# Patient Record
Sex: MALE | Race: WHITE | NOT HISPANIC OR LATINO | URBAN - METROPOLITAN AREA
[De-identification: names, ages, dates, MRNs, and addresses within clinical notes are randomized per-mention and may not be internally consistent; named-entity substitution may affect disease eponyms.]

---

## 2023-09-08 ENCOUNTER — HOSPITAL ENCOUNTER (OUTPATIENT)
Dept: DATA CONVERSION | Facility: HOSPITAL | Age: 58
End: 2023-09-08
Attending: STUDENT IN AN ORGANIZED HEALTH CARE EDUCATION/TRAINING PROGRAM | Admitting: STUDENT IN AN ORGANIZED HEALTH CARE EDUCATION/TRAINING PROGRAM
Payer: COMMERCIAL

## 2023-09-08 DIAGNOSIS — K22.2 ESOPHAGEAL OBSTRUCTION: ICD-10-CM

## 2023-09-08 DIAGNOSIS — K57.30 DIVERTICULOSIS OF LARGE INTESTINE WITHOUT PERFORATION OR ABSCESS WITHOUT BLEEDING: ICD-10-CM

## 2023-09-08 DIAGNOSIS — K31.89 OTHER DISEASES OF STOMACH AND DUODENUM: ICD-10-CM

## 2023-09-08 DIAGNOSIS — K64.4 RESIDUAL HEMORRHOIDAL SKIN TAGS: ICD-10-CM

## 2023-09-08 DIAGNOSIS — K29.70 GASTRITIS, UNSPECIFIED, WITHOUT BLEEDING: ICD-10-CM

## 2023-09-08 DIAGNOSIS — K62.1 RECTAL POLYP: ICD-10-CM

## 2023-09-08 DIAGNOSIS — Z12.11 ENCOUNTER FOR SCREENING FOR MALIGNANT NEOPLASM OF COLON: ICD-10-CM

## 2023-09-08 DIAGNOSIS — R13.10 DYSPHAGIA, UNSPECIFIED: ICD-10-CM

## 2023-09-08 DIAGNOSIS — R13.10 DYSPHAGIA: ICD-10-CM

## 2023-09-08 DIAGNOSIS — K64.1 SECOND DEGREE HEMORRHOIDS: ICD-10-CM

## 2023-09-08 DIAGNOSIS — K21.00 GASTRO-ESOPHAGEAL REFLUX DISEASE WITH ESOPHAGITIS, WITHOUT BLEEDING: ICD-10-CM

## 2023-09-15 LAB
COMPLETE PATHOLOGY REPORT: NORMAL
CONVERTED ADDENDUM DIAGNOSIS 2: NORMAL
CONVERTED CLINICAL DIAGNOSIS-HISTORY: NORMAL
CONVERTED FINAL DIAGNOSIS: NORMAL
CONVERTED FINAL REPORT PDF LINK TO COPY AND PASTE: NORMAL
CONVERTED GROSS DESCRIPTION: NORMAL

## 2023-09-29 VITALS — HEIGHT: 70 IN | BODY MASS INDEX: 22.95 KG/M2 | WEIGHT: 160.27 LBS

## 2024-05-09 ENCOUNTER — OFFICE VISIT (OUTPATIENT)
Dept: ORTHOPEDIC SURGERY | Facility: CLINIC | Age: 59
End: 2024-05-09
Payer: COMMERCIAL

## 2024-05-09 ENCOUNTER — HOSPITAL ENCOUNTER (OUTPATIENT)
Dept: RADIOLOGY | Facility: CLINIC | Age: 59
Discharge: HOME | End: 2024-05-09
Payer: COMMERCIAL

## 2024-05-09 DIAGNOSIS — M25.561 RIGHT KNEE PAIN, UNSPECIFIED CHRONICITY: ICD-10-CM

## 2024-05-09 DIAGNOSIS — M17.11 PRIMARY OSTEOARTHRITIS OF RIGHT KNEE: Primary | ICD-10-CM

## 2024-05-09 PROCEDURE — 99203 OFFICE O/P NEW LOW 30 MIN: CPT | Performed by: ORTHOPAEDIC SURGERY

## 2024-05-09 PROCEDURE — 73564 X-RAY EXAM KNEE 4 OR MORE: CPT | Mod: RT

## 2024-05-09 PROCEDURE — 99213 OFFICE O/P EST LOW 20 MIN: CPT | Performed by: ORTHOPAEDIC SURGERY

## 2024-05-09 PROCEDURE — 73564 X-RAY EXAM KNEE 4 OR MORE: CPT | Mod: RIGHT SIDE | Performed by: STUDENT IN AN ORGANIZED HEALTH CARE EDUCATION/TRAINING PROGRAM

## 2024-05-09 PROCEDURE — 1036F TOBACCO NON-USER: CPT | Performed by: ORTHOPAEDIC SURGERY

## 2024-05-09 RX ORDER — ATORVASTATIN CALCIUM 20 MG/1
TABLET, FILM COATED ORAL
COMMUNITY

## 2024-05-09 RX ORDER — OMEPRAZOLE 20 MG/1
20 CAPSULE, DELAYED RELEASE ORAL 2 TIMES DAILY
COMMUNITY
Start: 2023-11-06

## 2024-05-09 ASSESSMENT — PAIN SCALES - GENERAL: PAINLEVEL_OUTOF10: 8

## 2024-05-09 ASSESSMENT — PAIN - FUNCTIONAL ASSESSMENT: PAIN_FUNCTIONAL_ASSESSMENT: 0-10

## 2024-05-09 ASSESSMENT — PAIN DESCRIPTION - DESCRIPTORS: DESCRIPTORS: ACHING;THROBBING

## 2024-05-09 NOTE — PROGRESS NOTES
Patient is a 58-year-old gentleman who presents today for evaluation of right knee osteoarthritis.  He has a remote history of arthroscopy.  He takes ibuprofen.  He has not had any cortisone injections.  He had a knee replacement on the left at another hospital.    Right knee:  AAOx3, NAD, walks with a moderate antalgic gait  Varus allignment  Range of motion lacks 10 degrees of full extension and flexes to 110 degrees  Stable to varus/valgus/anterior/posterior stress through out the range of motion  Slight laxity with varus stress  Diffuse medial  joint line tenderness to palpation  Moderate effusion  SILT in a zulay/saph/per/tib distribution  5/5 knee extension/df/pf/ehl  ½ dorsalis pedis and posterior tibial pulse  no popliteal lymphadenopathy  no other overlying lesions  mood: euthymic  Respirations non labored    Plain films were reviewed by myself in clinic today.  He has advanced osteoarthritis of his right knee with complete loss of his medial joint space, underlying sclerosis and tricompartmental osteophytic change.    We discussed further conservative treatment versus surgery with him today in clinic.  Ultimately he would benefit from total knee replacement.  He would like to hold off for as long as he can.  He underwent a cortisone injection today in clinic.  He can repeat this every 3 to 4 months as needed.  All of his questions were answered.    This note was created using voice recognition software and was not corrected for typographical or grammatical errors.

## 2024-08-08 ENCOUNTER — APPOINTMENT (OUTPATIENT)
Dept: ORTHOPEDIC SURGERY | Facility: CLINIC | Age: 59
End: 2024-08-08
Payer: COMMERCIAL

## 2024-10-10 ENCOUNTER — OFFICE VISIT (OUTPATIENT)
Dept: ORTHOPEDIC SURGERY | Facility: CLINIC | Age: 59
End: 2024-10-10
Payer: COMMERCIAL

## 2024-10-10 VITALS — WEIGHT: 165 LBS | BODY MASS INDEX: 23.62 KG/M2 | HEIGHT: 70 IN

## 2024-10-10 DIAGNOSIS — M17.11 PRIMARY OSTEOARTHRITIS OF RIGHT KNEE: Primary | ICD-10-CM

## 2024-10-10 PROCEDURE — 3008F BODY MASS INDEX DOCD: CPT | Performed by: ORTHOPAEDIC SURGERY

## 2024-10-10 PROCEDURE — 20610 DRAIN/INJ JOINT/BURSA W/O US: CPT | Mod: RT | Performed by: ORTHOPAEDIC SURGERY

## 2024-10-10 PROCEDURE — 1036F TOBACCO NON-USER: CPT | Performed by: ORTHOPAEDIC SURGERY

## 2024-10-10 PROCEDURE — 2500000004 HC RX 250 GENERAL PHARMACY W/ HCPCS (ALT 636 FOR OP/ED): Performed by: ORTHOPAEDIC SURGERY

## 2024-10-10 RX ORDER — LIDOCAINE HYDROCHLORIDE 10 MG/ML
4 INJECTION, SOLUTION INFILTRATION; PERINEURAL
Status: COMPLETED | OUTPATIENT
Start: 2024-10-10 | End: 2024-10-10

## 2024-10-10 RX ORDER — TRIAMCINOLONE ACETONIDE 40 MG/ML
1 INJECTION, SUSPENSION INTRA-ARTICULAR; INTRAMUSCULAR
Status: COMPLETED | OUTPATIENT
Start: 2024-10-10 | End: 2024-10-10

## 2024-10-10 ASSESSMENT — PAIN - FUNCTIONAL ASSESSMENT: PAIN_FUNCTIONAL_ASSESSMENT: 0-10

## 2024-10-10 ASSESSMENT — PAIN SCALES - GENERAL: PAINLEVEL_OUTOF10: 10 - WORST POSSIBLE PAIN

## 2024-10-10 NOTE — PROGRESS NOTES
L Inj/Asp: R knee on 10/10/2024 3:21 PM  Indications: pain and joint swelling  Details: 22 G needle, anterolateral approach  Medications: 1 mL triamcinolone acetonide 40 mg/mL; 4 mL lidocaine 10 mg/mL (1 %)  Outcome: tolerated well, no immediate complications    Discussion:  I discussed the conservative treatment options for knee osteoarthritis including but not limited to physical therapy, oral NSAIDS, activity and lifestyle modification, and corticosteroid injections. Pt has elected to undergo a cortisone injection today. I have explained the risk and benefits of an injection including the possibility of joint infection, bleeding, damage to cartilage, allergic reaction. Patient verbalized understanding and gave verbal consent wishes to proceed with a intra-articular cortisone injection for their knee.    Procedure:  After discussing the risk and benefits of the procedure, we proceeded with an intra-articular right knee injection.    With the patient's informed verbal consent, the right knee was prepped in standard sterile fashion with Chlorhexidine. The skin was then anesthetized with ethyl chloride spray and cleaned again with Chlorhexidine. The knee was then apirated/injected with a prefilled 20-gauge syringe of 40 mg Kenalog + 4 ml Lidocaine using the lateral approach without complications.  The patient tolerated this well and felt immediate initial relief of symptoms. A bandaid was applied and the patient ambulated out of the clinic on ther own accord without difficulty. Patient was instructed to avoid physical activity for 24-48 hours to prevent the knees from swelling and may ice the knees as tolerated. Patient should contact the office if any signs of of infection appear: redness, fever, chills, drainage, swelling or warmth to the knees.  Pt understands that the injections can be repeated no sooner than 3 months.    Procedure, treatment alternatives, risks and benefits explained, specific risks discussed.  Consent was given by the patient. Immediately prior to procedure a time out was called to verify the correct patient, procedure, equipment, support staff and site/side marked as required. Patient was prepped and draped in the usual sterile fashion.

## 2025-02-03 PROBLEM — M17.11 UNILATERAL PRIMARY OSTEOARTHRITIS, RIGHT KNEE: Status: ACTIVE | Noted: 2025-02-02

## 2025-02-20 ENCOUNTER — OFFICE VISIT (OUTPATIENT)
Dept: ORTHOPEDIC SURGERY | Facility: CLINIC | Age: 60
End: 2025-02-20
Payer: COMMERCIAL

## 2025-02-20 DIAGNOSIS — M17.11 PRIMARY OSTEOARTHRITIS OF RIGHT KNEE: Primary | ICD-10-CM

## 2025-02-20 PROCEDURE — 99214 OFFICE O/P EST MOD 30 MIN: CPT | Performed by: ORTHOPAEDIC SURGERY

## 2025-02-20 PROCEDURE — 1036F TOBACCO NON-USER: CPT | Performed by: ORTHOPAEDIC SURGERY

## 2025-02-20 NOTE — PROGRESS NOTES
Patient is a 59-year-old gentleman who presents today for evaluation of upcoming right total knee arthroplasty having decided to proceed with surgery.  He rates his pain as 9 out of 10.  He is failed a greater than 3-month trial reasonable conservative treatment including cortisone injections, ibuprofen and activity modification.  He has difficulty walking down stairs or going any sort of distance.  He is extremely fed up with his quality of life and like to proceed with total knee arthroplasty which is indicated at this time.    Right knee:AAOx3, NAD, walks with a moderate antalgic gait  Varus allignment  Range of motion lacks 10 degrees of full extension and flexes to 110 degrees  Stable to varus/valgus/anterior/posterior stress through out the range of motion  Slight laxity with varus stress  Diffuse medial  joint line tenderness to palpation  Moderate effusion  SILT in a zulay/saph/per/tib distribution  5/5 knee extension/df/pf/ehl  ½ dorsalis pedis and posterior tibial pulse  no popliteal lymphadenopathy  no other overlying lesions  mood: euthymic  Respirations non labored    Plain films were reviewed by myself in clinic today.  They have advanced osteoarthritis of their knee with significant joint space narrowing, bone on bone changes, underlying sclerosis and tricompartmental osteophytic change.    Patient is now failed a greater than 3-month trial of reasonable conservative treatment and wishes proceed with surgery which is indicated at this time.  Discussed the risk benefits alternatives to surgery.  All of their questions were answered.    Procedure: right total knee  [place] [facility] [place]  ICD10: M17.11  CPT: 00799  Stay: outpatient  Equipment: [facility]    I talked with the patient at length about risks, limitations, benefits and alternatives to total knee replacement today. I reviewed concerns about implant wear, loosening, breakage, infection and infection prophylaxis, DVT, PE, death and other medical  and anesthetic complications of surgery. We talked about the potential for persistent pain following surgery since there are many possible causes for knee and leg pain. The patient was advised that knee replacement will only relieve pain that is coming from the knee. We talked about limited range of motion following knee replacement and the importance of physical therapy and their motivation. We talked about improvements in pain management post-operatively and our accelerated rehab program. We talked about wound healing issues and neurovascular complications of surgery. I reviewed functional and activity restrictions in detail. We discussed the fact that many of our patients are able to go home in 1 day or less depending on their health, mobility, pre-op preparation, individual home situation and personal preference.  The patient has identified their personal goals of their joint replacement surgery and recovery and we have discussed them. These are documented in our [employer] patient engagement platform. In addition, we have discussed the advantages and disadvantages of various implant and fixation options, as well as various surgical approaches.  The basic concepts of the joint replacement procedure has been reviewed with the patient and the patient has been provided the opportunity to see an actual implant either in the office or in our pre-op education class.  All of the patients questions were answered. The patient can call my office to schedule surgery and the pre-op teaching class. I told the patient that they should contact their primary care physician to discuss fitness for surgery.    This note was created using voice recognition software and was not corrected for typographical or grammatical errors.

## 2025-03-14 ENCOUNTER — HOSPITAL ENCOUNTER (OUTPATIENT)
Dept: RADIOLOGY | Facility: HOSPITAL | Age: 60
Discharge: HOME | End: 2025-03-14
Payer: COMMERCIAL

## 2025-03-14 ENCOUNTER — PRE-ADMISSION TESTING (OUTPATIENT)
Dept: PREADMISSION TESTING | Facility: HOSPITAL | Age: 60
End: 2025-03-14
Payer: COMMERCIAL

## 2025-03-14 ENCOUNTER — EDUCATION (OUTPATIENT)
Dept: ORTHOPEDIC SURGERY | Facility: HOSPITAL | Age: 60
End: 2025-03-14
Payer: COMMERCIAL

## 2025-03-14 ENCOUNTER — LAB (OUTPATIENT)
Dept: LAB | Facility: HOSPITAL | Age: 60
End: 2025-03-14
Payer: COMMERCIAL

## 2025-03-14 VITALS
TEMPERATURE: 98.1 F | WEIGHT: 171 LBS | SYSTOLIC BLOOD PRESSURE: 136 MMHG | OXYGEN SATURATION: 100 % | HEART RATE: 63 BPM | DIASTOLIC BLOOD PRESSURE: 91 MMHG | HEIGHT: 70 IN | BODY MASS INDEX: 24.48 KG/M2 | RESPIRATION RATE: 16 BRPM

## 2025-03-14 DIAGNOSIS — M17.11 UNILATERAL PRIMARY OSTEOARTHRITIS, RIGHT KNEE: ICD-10-CM

## 2025-03-14 DIAGNOSIS — E78.5 HYPERLIPIDEMIA, UNSPECIFIED HYPERLIPIDEMIA TYPE: ICD-10-CM

## 2025-03-14 DIAGNOSIS — Z01.818 PREOP TESTING: Primary | ICD-10-CM

## 2025-03-14 DIAGNOSIS — Z01.818 ENCOUNTER FOR OTHER PREPROCEDURAL EXAMINATION: Primary | ICD-10-CM

## 2025-03-14 LAB
ALBUMIN SERPL BCP-MCNC: 4.9 G/DL (ref 3.4–5)
ALP SERPL-CCNC: 83 U/L (ref 33–120)
ALT SERPL W P-5'-P-CCNC: 29 U/L (ref 10–52)
ANION GAP SERPL CALC-SCNC: 13 MMOL/L (ref 10–20)
AST SERPL W P-5'-P-CCNC: 25 U/L (ref 9–39)
ATRIAL RATE: 67 BPM
BILIRUB SERPL-MCNC: 0.7 MG/DL (ref 0–1.2)
BUN SERPL-MCNC: 15 MG/DL (ref 6–23)
CALCIUM SERPL-MCNC: 10 MG/DL (ref 8.6–10.3)
CHLORIDE SERPL-SCNC: 102 MMOL/L (ref 98–107)
CO2 SERPL-SCNC: 27 MMOL/L (ref 21–32)
CREAT SERPL-MCNC: 0.94 MG/DL (ref 0.5–1.3)
EGFRCR SERPLBLD CKD-EPI 2021: >90 ML/MIN/1.73M*2
ERYTHROCYTE [DISTWIDTH] IN BLOOD BY AUTOMATED COUNT: 12.4 % (ref 11.5–14.5)
EST. AVERAGE GLUCOSE BLD GHB EST-MCNC: 88 MG/DL
GLUCOSE SERPL-MCNC: 94 MG/DL (ref 74–99)
HBA1C MFR BLD: 4.7 %
HCT VFR BLD AUTO: 44.2 % (ref 41–52)
HGB BLD-MCNC: 15.3 G/DL (ref 13.5–17.5)
MCH RBC QN AUTO: 31.4 PG (ref 26–34)
MCHC RBC AUTO-ENTMCNC: 34.6 G/DL (ref 32–36)
MCV RBC AUTO: 91 FL (ref 80–100)
NRBC BLD-RTO: NORMAL /100{WBCS}
P AXIS: 52 DEGREES
P OFFSET: 192 MS
P ONSET: 147 MS
PLATELET # BLD AUTO: 289 X10*3/UL (ref 150–450)
POTASSIUM SERPL-SCNC: 5.2 MMOL/L (ref 3.5–5.3)
PR INTERVAL: 146 MS
PROT SERPL-MCNC: 7.4 G/DL (ref 6.4–8.2)
Q ONSET: 220 MS
QRS COUNT: 11 BEATS
QRS DURATION: 84 MS
QT INTERVAL: 372 MS
QTC CALCULATION(BAZETT): 393 MS
QTC FREDERICIA: 386 MS
R AXIS: 34 DEGREES
RBC # BLD AUTO: 4.87 X10*6/UL (ref 4.5–5.9)
SODIUM SERPL-SCNC: 137 MMOL/L (ref 136–145)
T AXIS: 42 DEGREES
T OFFSET: 406 MS
VENTRICULAR RATE: 67 BPM
WBC # BLD AUTO: 5.9 X10*3/UL (ref 4.4–11.3)

## 2025-03-14 PROCEDURE — 77073 BONE LENGTH STUDIES: CPT

## 2025-03-14 PROCEDURE — 93005 ELECTROCARDIOGRAM TRACING: CPT

## 2025-03-14 PROCEDURE — 99204 OFFICE O/P NEW MOD 45 MIN: CPT | Performed by: PHYSICIAN ASSISTANT

## 2025-03-14 PROCEDURE — 85027 COMPLETE CBC AUTOMATED: CPT

## 2025-03-14 PROCEDURE — 80053 COMPREHEN METABOLIC PANEL: CPT

## 2025-03-14 PROCEDURE — 87081 CULTURE SCREEN ONLY: CPT | Mod: BEALAB

## 2025-03-14 PROCEDURE — 83036 HEMOGLOBIN GLYCOSYLATED A1C: CPT

## 2025-03-14 PROCEDURE — 73562 X-RAY EXAM OF KNEE 3: CPT | Mod: RT

## 2025-03-14 PROCEDURE — 93010 ELECTROCARDIOGRAM REPORT: CPT | Performed by: INTERNAL MEDICINE

## 2025-03-14 RX ORDER — ASCORBIC ACID 250 MG
250 TABLET ORAL DAILY
COMMUNITY

## 2025-03-14 RX ORDER — CHLORHEXIDINE GLUCONATE 40 MG/ML
SOLUTION TOPICAL DAILY
Qty: 470 ML | Refills: 0 | Status: SHIPPED | OUTPATIENT
Start: 2025-03-14 | End: 2025-03-19

## 2025-03-14 RX ORDER — CHLORHEXIDINE GLUCONATE ORAL RINSE 1.2 MG/ML
SOLUTION DENTAL
Qty: 473 ML | Refills: 0 | Status: SHIPPED | OUTPATIENT
Start: 2025-03-14

## 2025-03-14 RX ORDER — CHOLECALCIFEROL (VITAMIN D3) 25 MCG
1000 TABLET ORAL DAILY
COMMUNITY

## 2025-03-14 RX ORDER — IBUPROFEN 800 MG/1
800 TABLET ORAL EVERY 8 HOURS PRN
COMMUNITY

## 2025-03-14 ASSESSMENT — ENCOUNTER SYMPTOMS
ENDOCRINE NEGATIVE: 1
EYES NEGATIVE: 1
NECK NEGATIVE: 1
CARDIOVASCULAR NEGATIVE: 1
RESPIRATORY NEGATIVE: 1
CONSTITUTIONAL NEGATIVE: 1
GASTROINTESTINAL NEGATIVE: 1
NEUROLOGICAL NEGATIVE: 1

## 2025-03-14 ASSESSMENT — DUKE ACTIVITY SCORE INDEX (DASI)
CAN YOU WALK A BLOCK OR TWO ON LEVEL GROUND: YES
DASI METS SCORE: 8
CAN YOU HAVE SEXUAL RELATIONS: YES
CAN YOU CLIMB A FLIGHT OF STAIRS OR WALK UP A HILL: YES
CAN YOU DO HEAVY WORK AROUND THE HOUSE LIKE SCRUBBING FLOORS OR LIFTING AND MOVING HEAVY FURNITURE: YES
CAN YOU DO MODERATE WORK AROUND THE HOUSE LIKE VACUUMING, SWEEPING FLOORS OR CARRYING GROCERIES: YES
CAN YOU TAKE CARE OF YOURSELF (EAT, DRESS, BATHE, OR USE TOILET): YES
CAN YOU DO LIGHT WORK AROUND THE HOUSE LIKE DUSTING OR WASHING DISHES: YES
CAN YOU RUN A SHORT DISTANCE: NO
CAN YOU WALK INDOORS, SUCH AS AROUND YOUR HOUSE: YES
CAN YOU DO YARD WORK LIKE RAKING LEAVES, WEEDING OR PUSHING A MOWER: YES
CAN YOU PARTICIPATE IN STRENOUS SPORTS LIKE SWIMMING, SINGLES TENNIS, FOOTBALL, BASKETBALL, OR SKIING: NO
TOTAL_SCORE: 42.7
CAN YOU PARTICIPATE IN MODERATE RECREATIONAL ACTIVITIES LIKE GOLF, BOWLING, DANCING, DOUBLES TENNIS OR THROWING A BASEBALL OR FOOTBALL: YES

## 2025-03-14 ASSESSMENT — LIFESTYLE VARIABLES: SMOKING_STATUS: NONSMOKER

## 2025-03-14 ASSESSMENT — PAIN - FUNCTIONAL ASSESSMENT: PAIN_FUNCTIONAL_ASSESSMENT: 0-10

## 2025-03-14 ASSESSMENT — PAIN DESCRIPTION - DESCRIPTORS: DESCRIPTORS: SHARP

## 2025-03-14 ASSESSMENT — PAIN SCALES - GENERAL: PAINLEVEL_OUTOF10: 10 - WORST POSSIBLE PAIN

## 2025-03-14 NOTE — CPM/PAT H&P
CPM/PAT Evaluation       Name: Tye Rowley (Tye Rowley)  /Age: 1965/59 y.o.     Visit Type:   In-Person       Chief Complaint: right knee pain    HPI : Patient is a 60 yo M scheduled for right total knee replacement on 25 with Dr. Lisa secondary to right knee osteoarthritis. Pt notes sharp right knee pain with every step. Patient was referred by Dr. Lisa to CPM today for perioperative risk stratification and optimization. Patient's PMHx is notable for HLD, GERD      Past Medical History:   Diagnosis Date    Arthritis     GERD (gastroesophageal reflux disease)     History of blood transfusion     at age 19    Hyperlipidemia        Past Surgical History:   Procedure Laterality Date    COLONOSCOPY      KNEE ARTHROPLASTY Left 2017    UPPER GASTROINTESTINAL ENDOSCOPY         Patient  has no history on file for sexual activity.    Family History   Problem Relation Name Age of Onset    Alcohol abuse Mother Hetal     Heart attack Father  60    Heart disease Maternal Grandfather Donte        No Known Allergies    Prior to Admission medications    Medication Sig Start Date End Date Taking? Authorizing Provider   ascorbic acid (Vitamin C) 250 mg tablet Take 1 tablet (250 mg) by mouth once daily.   Yes Historical Provider, MD   cholecalciferol (Vitamin D3) 25 mcg (1000 units) tablet Take 1 tablet (1,000 Units) by mouth once daily.   Yes Historical Provider, MD   ibuprofen 800 mg tablet Take 1 tablet (800 mg) by mouth every 8 hours if needed for mild pain (1 - 3).   Yes Historical Provider, MD   ZINC ACETATE ORAL Use in the mouth or throat.   Yes Historical Provider, MD   atorvastatin (Lipitor) 20 mg tablet     Historical Provider, MD   chlorhexidine (Hibiclens) 4 % external liquid Apply topically once daily for 5 days. 3/14/25 3/19/25  Soraida Bucio PA-C   chlorhexidine (Peridex) 0.12 % solution Swish and spit 15 mL night before surgery and morning of surgery 3/14/25   Soraida Bucio PA-C    omeprazole (PriLOSEC) 20 mg DR capsule Take 1 capsule (20 mg) by mouth 2 times a day.  Patient not taking: Reported on 3/14/2025 11/6/23   Historical Provider, MD HANSON ROS:   Constitutional:   neg    Neuro/Psych:   neg    Eyes:   neg    Ears:   neg    Nose:   neg    Mouth:   neg    Throat:   neg    Neck:   neg    Cardio:   neg    Respiratory:   neg    Endocrine:   neg    GI:   neg    :   neg    Musculoskeletal:    +Right knee pain  Hematologic:   neg    Skin:  neg        Physical Exam  Vitals and nursing note reviewed.   Constitutional:       General: He is not in acute distress.     Appearance: He is normal weight. He is not ill-appearing or toxic-appearing.   HENT:      Head: Normocephalic and atraumatic.      Right Ear: External ear normal.      Left Ear: External ear normal.      Nose: Nose normal.      Mouth/Throat:      Mouth: Mucous membranes are moist.   Eyes:      Extraocular Movements: Extraocular movements intact.      Conjunctiva/sclera: Conjunctivae normal.   Neck:      Vascular: No carotid bruit.   Cardiovascular:      Rate and Rhythm: Normal rate and regular rhythm.      Pulses: Normal pulses.      Heart sounds: Normal heart sounds.   Pulmonary:      Effort: Pulmonary effort is normal.      Breath sounds: Normal breath sounds.   Abdominal:      General: There is no distension.      Palpations: Abdomen is soft.      Tenderness: There is no abdominal tenderness.   Musculoskeletal:         General: Normal range of motion.      Cervical back: Normal range of motion.   Skin:     General: Skin is warm and dry.      Capillary Refill: Capillary refill takes less than 2 seconds.   Neurological:      General: No focal deficit present.      Mental Status: He is alert.   Psychiatric:         Mood and Affect: Mood normal.         Behavior: Behavior normal.          PAT AIRWAY:   Airway:     Mallampati::  I    TM distance::  >3 FB    Neck ROM::  Full             Visit Vitals  BP (!) 136/91   Pulse 63  "  Temp 36.7 °C (98.1 °F)   Resp 16   Ht 1.778 m (5' 10\")   Wt 77.6 kg (171 lb)   SpO2 100%   BMI 24.54 kg/m²   Smoking Status Former   BSA 1.96 m²       DASI Risk Score      Flowsheet Row Pre-Admission Testing from 3/14/2025 in [facility] Questionnaire Series Submission from 2/11/2025 in [facility] with Generic Provider Yamilka   Can you take care of yourself (eat, dress, bathe, or use toilet)?  2.75 filed at 03/14/2025 0810 2.75  filed at 02/11/2025 1045   Can you walk indoors, such as around your house? 1.75 filed at 03/14/2025 0810 1.75  filed at 02/11/2025 1045   Can you walk a block or two on level ground?  2.75 filed at 03/14/2025 0810 2.75  filed at 02/11/2025 1045   Can you climb a flight of stairs or walk up a hill? 5.5 filed at 03/14/2025 0810 5.5  filed at 02/11/2025 1045   Can you run a short distance? 0 filed at 03/14/2025 0810 0  filed at 02/11/2025 1045   Can you do light work around the house like dusting or washing dishes? 2.7 filed at 03/14/2025 0810 2.7  filed at 02/11/2025 1045   Can you do moderate work around the house like vacuuming, sweeping floors or carrying groceries? 3.5 filed at 03/14/2025 0810 3.5  filed at 02/11/2025 1045   Can you do heavy work around the house like scrubbing floors or lifting and moving heavy furniture?  8 filed at 03/14/2025 0810 8  filed at 02/11/2025 1045   Can you do yard work like raking leaves, weeding or pushing a mower? 4.5 filed at 03/14/2025 0810 4.5  filed at 02/11/2025 1045   Can you have sexual relations? 5.25 filed at 03/14/2025 0810 5.25  filed at 02/11/2025 1045   Can you participate in moderate recreational activities like golf, bowling, dancing, doubles tennis or throwing a baseball or football? 6 filed at 03/14/2025 0810 6  filed at 02/11/2025 1045   Can you participate in strenous sports like swimming, singles tennis, football, basketball, or skiing? 0 filed at 03/14/2025 0810 0  filed at 02/11/2025 1045   DASI SCORE 42.7 filed " at 03/14/2025 0810 42.7  filed at 02/11/2025 1045   METS Score (Will be calculated only when all the questions are answered) 8 filed at 03/14/2025 0810 8  filed at 02/11/2025 1045          Caprini DVT Assessment      Flowsheet Row Pre-Admission Testing from 3/14/2025 in [facility]   DVT Score (IF A SCORE IS NOT CALCULATING, MUST SELECT A BMI TO COMPLETE) 7 filed at 03/14/2025 0757   Surgical Factors Elective major lower extremity arthroplasty filed at 03/14/2025 0757   BMI (BMI MUST BE CHOSEN) 30 or less filed at 03/14/2025 0757          Modified Frailty Index    No data to display       [id number] Stroke Risk Points  Current as of 2 minutes ago        N/A 0 to 9 Points:      Last Change: N/A          The [id number] risk score (Lip LUDY, et al. 2009. © 2010 American College [employer]) quantifies the risk of stroke for a patient with atrial fibrillation. For patients without atrial fibrillation or under the age of 18 this score appears as N/A. Higher score values generally indicate higher risk of stroke.        This score is not applicable to this patient. Components are not calculated.          Revised Cardiac Risk Index      Flowsheet Row Pre-Admission Testing from 3/14/2025 in [facility]   High-Risk Surgery (Intraperitoneal, Intrathoracic,Suprainguinal vascular) 0 filed at 03/14/2025 0810   History of ischemic heart disease (History of MI, History of positive exercuse test, Current chest paint considered due to myocardial ischemia, Use of nitrate therapy, ECG with pathological Q Waves) 0 filed at 03/14/2025 0810   History of congestive heart failure (pulmonary edemia, bilateral rales or S3 gallop, Paroxysmal nocturnal dyspnea, CXR showing pulmonary vascular redistribution) 0 filed at 03/14/2025 0810   History of cerebrovascular disease (Prior TIA or stroke) 0 filed at 03/14/2025 0810   Pre-operative insulin treatment 0 filed at 03/14/2025 0810   Pre-operative  creatinine>2 mg/dl 0 filed at 03/14/2025 0810   Revised Cardiac Risk Calculator 0 filed at 03/14/2025 0810          Apfel Simplified Score      Flowsheet Row Pre-Admission Testing from 3/14/2025 in [facility]   Smoking status 1 filed at 03/14/2025 0810   History of motion sickness or PONV  0 filed at 03/14/2025 0810   Use of postoperative opioids 0 filed at 03/14/2025 0810   Gender - Female 0=No filed at 03/14/2025 0810   Apfel Simplified Score Calculator 1 filed at 03/14/2025 0810          Risk Analysis Index Results This Encounter    No data found in the last 10 encounters.       Stop Bang Score      Flowsheet Row Pre-Admission Testing from 3/14/2025 in [facility] Questionnaire Series Submission from 2/11/2025 in [facility] with Generic Provider Yamilka   Do you snore loudly? 1  [occasionally] filed at 03/14/2025 0747 1  filed at 02/11/2025 1045   Do you often feel tired or fatigued after your sleep? 1 filed at 03/14/2025 0747 1  filed at 02/11/2025 1045   Has anyone ever observed you stop breathing in your sleep? 0 filed at 03/14/2025 0747 1  filed at 02/11/2025 1045   Do you have or are you being treated for high blood pressure? 0 filed at 03/14/2025 0747 0  filed at 02/11/2025 1045   Recent BMI (Calculated) 24.5 filed at 03/14/2025 0747 23.7  filed at 02/11/2025 1045   Is BMI greater than 35 kg/m2? 0=No filed at 03/14/2025 0747 0=No  filed at 02/11/2025 1045   Age older than 50 years old? 1=Yes filed at 03/14/2025 0747 1=Yes  filed at 02/11/2025 1045   Is your neck circumference greater than 17 inches (Male) or 16 inches (Female)? 1 filed at 03/14/2025 0747 --   Gender - Male 1=Yes filed at 03/14/2025 0747 1=Yes  filed at 02/11/2025 1045   STOP-BANG Total Score 5 filed at 03/14/2025 0747 --          Prodigy: High Risk  Total Score: 8              Prodigy Gender Score          ARISCAT Score for Postoperative Pulmonary Complications      Flowsheet Row Pre-Admission Testing from  3/14/2025 in [facility]   Age Calculated Score 3 filed at 03/14/2025 0759   Preoperative SpO2 0 filed at 03/14/2025 0759   Respiratory infection in the last month Either upper or lower (i.e., URI, bronchitis, pneumonia), with fever and antibiotic treatment 0 filed at 03/14/2025 0759   Preoperative anemia (Hgb less than 10 g/dl) 0 filed at 03/14/2025 0759   Surgical incision  0 filed at 03/14/2025 0759   Duration of surgery  16 filed at 03/14/2025 0759   Emergency Procedure  0 filed at 03/14/2025 0759   ARISCAT Total Score  19 filed at 03/14/2025 0759          [place] Perioperative Risk for Myocardial Infarction or Cardiac Arrest (POLI)      Flowsheet Row Pre-Admission Testing from 3/14/2025 in [facility]   Calculated Age Score 1.16 filed at 03/14/2025 0759   Functional Status  0 filed at 03/14/2025 0759   ASA Class  -3.29 filed at 03/14/2025 0759   Creatinine 0 filed at 03/14/2025 0759   Type of Procedure  0.80 filed at 03/14/2025 0759   POLI Total Score  -6.58 filed at 03/14/2025 0759   POLI % 0.14 filed at 03/14/2025 0759            Assessment & Plan    Neuro:   No diagnosis or significant findings on chart review or clinical presentation and evaluation.    Preoperative brain exercise educational handout provided to patient.    The patient is at an increased risk for perioperative stroke secondary to HLD.    HEENT/Airway:   No diagnosis or significant findings on chart review or clinical presentation and evaluation.   STOP-BANG Score- 5 points high risk for JEFF  Advised to discuss testing for sleep apnea w/ PCP    Mallampati::  I    TM distance::  >3 FB    Neck ROM::  Full  Dentures-denies  Crowns-reports  Implants-denies    Cardiovascular:    -HLD - on statin, has been noncompliant  No additional preoperative testing is currently indicated.  METS: 8  RCRI: 0 points, 3.9%    30 day risk of MACE (risk for cardiac death, nonfatal myocardial infarction, and nonfactal cardiac  arrest  POLI:   0.14 % risk of intraoperative or 30-day postoperative MACE  EKG -normal EKG, normal sinus rhythm Rate 67- No acute changes      Pulmonary:     No diagnosis or significant findings on chart review or clinical presentation and evaluation.   ARISCAT: <26 points, 1.6% risk of in-hospital postoperative pulmonary complication  PRODIGY: High risk for opioid induced respiratory depression  Smoking History-He quit smoking approximately 15 years ago.  Preoperative deep breathing educational handout provided to patient.    Renal:  No diagnosis or significant findings on chart review or clinical presentation and evaluation, however, the patient is at increased risk of perioperative renal complications secondary to age>/= 56 and male sex. Preventative measures include BP monitoring, medication compliance, and hydration management.   CMP-Pending    Endocrine:  No diagnosis or significant findings on chart review or clinical presentation and evaluation.     Hematologic:    No diagnosis or significant findings on chart review or clinical presentation and evaluation.  The patient is not a Jehovah’s witness and will accept blood and blood products if medically indicated.   History of previous blood transfusions Yes - at age 19  CBC-Pending    Caprini Score 7, patient at Moderate for postoperative DVT. Pt supplied education/VTE handout  Anticoagulation use: No   Preoperative DVT educational handout provided to patient.    Gastrointestinal:     -GERD  Recreational drug use: Drug use No  Alcohol use > 5 beers per week    Apfel: 1 points 21% risk for post operative N/V    Infectious disease:    No diagnosis or significant findings on chart review or clinical presentation and evaluation.   Prescription provided for CHG body wash and dental rinse. CHG use instructions reviewed and provided to patient. Patient advised to call [facility] if rx not received.   Staph screen collected-Pending    Musculoskeletal:     -osteoarthritis of right knee- scheduled for surgery      Anesthesia:  ASA 2 - Patient with mild systemic disease with no functional limitations    History of General anesthesia- yes  Complications- No anesthesia complications  No family history of anesthesia complications    Nickel/metal allergy-negative  Shellfish allergy-negative    Discussed with patient medication instructions, NPO guidelines, and any questions or concerns. Patient does not need further workup prior to preceding with elective surgery based on based on risk assessment.       Soraida Bucio PA-C 3/14/2025 8:26 AM      Pending labs ordered:  cbc, comp, A1c, and MSSA/MRSA culture  Follow up needed: labs

## 2025-03-14 NOTE — GROUP NOTE
In addition to the group class activities, Tye Rowley had the following lab work completed:  No orders of the defined types were placed in this encounter.      A new History and Physical was not completed.    This class lasted approximately 1 hour and had 6 participants. The nurse instructor covered the following topics:    MyChart Enrollment  Communication with Care Team  My Chart is the best form of communication to reach all of your caregivers  You can send messages to specific care givers, or a care team  Continued Education  You will be enrolled in a Joint Replacement care plan to receive additional education before and after surgery  You can review a short recording of the class content - [email or web address]  Access to Medical Records  You can access test results, office notes, appointments, etc.  You can connect to other healthcare systems who use [employer] ([facility], [facility], [facility], [place], etc.)  [employer]  Program Information  Opportunity to Opt Out    Background/Understanding of Joint Replacement Surgery  Potential for same day discharge  Any questions or concerns to be directed to the surgeon's office  Not all patients are appropriate for same day discharge  All patients will be required to meet discharge criteria prior to leaving our care    How to Prepare for Surgery  Use of Recreational Products (Nicotine, Alcohol, THC, CBD, Drugs, Etc.)  The ultimate goal is to quit using thee products!  Stop several weeks before surgery  Such products slow down the healing process and increase risk of post-op infection and complications  Clearance for Surgery  Preadmission Testing - Appropriateness for anesthesia  Medical Clearance by Specialists  Dental Clearance  Cracked/Broken/Loose teeth left untreated may postpone surgery  The importance of post-op antibiotics for dental visits per surgeon protocol  Preadmission Testing  **Potential for postponed surgery if appropriate clearance is not  obtained  Medication Instruction  Follow instructions provided by the doctor who prescribes your medication (typically, but not limited to cardiologist)  Preadmission testing will provide additional instructions during your appointment on what to stop and what to take as you get closer to surgery  For clarification of these instructions, please call preadmission testing directly - [phone]  Tips for Preparing the home for discharge from the hospital  Care Partner  Requirement for surgery, the patient must have a plan to have help at home  Potential for postponed surgery if plan for home support cannot be established  Your Care Partner does not need medical training  How the care partner can help after surgery  CHG Body Wash/Mouth Wash  Follow the instructions given at preadmission testing  Body wash is to be used on the body and hair for 5 washes  Mouthwash is to be used the night before and morning of surgery  **This is a system-wide protocol developed by infectious disease professionals, we will not alter our recommendations for those with sensitive skin or those who have special hair needs.  Please follow the instructions as they are written as this will provide the best infection prevention measures for surgery.  Should you have an allergy to one of the products, please discuss with your preadmission team**    What to Expect in the Hospital/At Home  Morning of Surgery NPO Guidelines  Nothing to eat after midnight  Water can be consumed up to 2 hours prior to arrival  Surgical and Post-Surgical Care Team  Surgical Team  Anesthesia Team  Nursing  Physical Therapy  Care Coordinating  Pharmacy  Hospital Arrival Instructions  Arrive at the time provided to you  Consider traffic patterns (rush-hour) based on arrival time  Have arrangements made for a ride home  If discharging same day, care partner should remain at the hospital  Recovering after Surgery  Recovery Room - Visitors are not brought back  Transition to  hospital room - 2nd Floor, Visitors will be directed to your room  The presence of and strategies for controlling surgical pain and swelling  The importance of early mobility  Side effects after surgery  What to expect if staying overnight    Discharge Planning  The intended plan for discharge will be for patients to discharge home  All patients require a care partner (family, friend, neighbor, etc.) to stay with the patient for the first few nights after surgery  The inability to secure help at home may postpone surgery  Home Care Services set up per surgeon order  Physical Therapy  Occupational Therapy  **If desired, private duty care can be arranged by the patient ahead of time**  Outpatient Physical Therapy per surgeon order    Recovering at Home  Wound Care  Follow wound care instructions found in your discharge paperwork  Bandage is water-resistant and you may shower with the bandage  Do not scrub directly over the bandage  Do not submerge in water until cleared (bathtub, hot tub, pool, etc.)    Post-Op Risk Prevention  Infection Prevention  Promptly seek treatment for any infections post-operatively  Routine dental visits must be postponed for 3 months after surgery  Your surgeon may require antibiotics prior to future dental visits  Any concerns for infection not related directly to the knee or the hip should be managed by your primary care provider  Blood Clots  Be sure to complete the course of blood thinning medication as prescribed by your surgeon  Movement every 1-2 hours during the day is encouraged to prevent blood clots  Monitor for signs of blood clots  Wear compression stockings as prescribed by your surgeon  Constipation  Constipation is common following surgery  Drink plenty of fluids  Take stool softener/laxative as prescribed by your surgeon  Move around frequently  Eat foods high in fiber  Fall Prevention  Prepare home ahead of time to clear space to move with walker  Remove throw rugs and  electrical cords from walkways  Install railings near any stairways with more than 2 steps  Use night lights for increased visibility at night  Continue to use your assistive device until cleared by surgeon or physical therapy  Dislocation Prevention - Not all procedures will have dislocation precautions  Follow dislocation precautions provided by your surgeon  It is OK to resume sexual activity about 6 weeks following surgery  Be sure to follow any dislocation precautions assigned    Durable Medical Equipment  Cold Therapy  Breg Cold Therapy Machines  Ice/Gel Packs  Assistive Devices  Folding Walker with Wheels (in the front only)  No Rollators  Crutches if approved by Physical Therapy and Surgeon after surgery  Hip Kits  Raised Toilet Seats  Additional Compression Stockings    Joint Preservation  Healthy Activities when Cleared  Walking  Swimming  Bike Riding  Activities to Avoid  Refrain from repetitive motions which have a high impact on the joint  Gradual Progression  Progress activity slowly, listen to your body  Common Findings - NORMAL after surgery  Clicking/Grinding  Numbness near incision    Physical Therapy  Prehabilitation exercises  START TODAY ON BOTH LEGS  Surgery Specific Precautions  Follow surgery specific precautions found in your discharge paperwork    Follow-Up Visit  All patients will see their surgeon for a follow up visit after surgery  The visit may range from 2-6 weeks after surgery and is surgeon specific      Please don't hesitate to reach out if you have any additional questions or concerns.    Ivory Valencia MBA, BSN, RN-BC, ONC  GIOVANNI Ozuna, RN  Vickie Roman, [id number]  Orthopedic Program Navigators  [facility]   [phone]

## 2025-03-14 NOTE — PREPROCEDURE INSTRUCTIONS
Medication List            Accurate as of March 14, 2025  8:00 AM. Always use your most recent med list.                ascorbic acid 250 mg tablet  Commonly known as: Vitamin C  Additional Medication Adjustments for Surgery: Take last dose 7 days before surgery  Notes to patient: Last dose 3/25/25     atorvastatin 20 mg tablet  Commonly known as: Lipitor  Medication Adjustments for Surgery: Take/Use as prescribed     * chlorhexidine 4 % external liquid  Commonly known as: Hibiclens  Apply topically once daily for 5 days.  Medication Adjustments for Surgery: Take/Use as prescribed     * chlorhexidine 0.12 % solution  Commonly known as: Peridex  Swish and spit 15 mL night before surgery and morning of surgery  Medication Adjustments for Surgery: Take/Use as prescribed     ibuprofen 800 mg tablet  Additional Medication Adjustments for Surgery: Take last dose 7 days before surgery  Notes to patient: Last dose 3/25/25     omeprazole 20 mg DR capsule  Commonly known as: PriLOSEC  Medication Adjustments for Surgery: Take/Use as prescribed     Vitamin D3 25 mcg (1000 units) tablet  Generic drug: cholecalciferol  Additional Medication Adjustments for Surgery: Take last dose 7 days before surgery  Notes to patient: Last dose 3/25/25     ZINC ACETATE ORAL  Additional Medication Adjustments for Surgery: Take last dose 7 days before surgery  Notes to patient: Last dose 3/25/25           * This list has 2 medication(s) that are the same as other medications prescribed for you. Read the directions carefully, and ask your doctor or other care provider to review them with you.                            Thank you for visiting [facility].  If you have any changes to your health condition, please call the surgeons office to alert them and give them details of your symptoms.      CONTACT SURGEON'S OFFICE IF YOU DEVELOP:  * Fever = 100.4 F   * New respiratory symptoms (e.g. cough, shortness of breath,  respiratory distress, sore throat)  * Recent loss of taste or smell  *Flu like symptoms such as headache, fatigue or gastrointestinal symptoms  * You develop any open sores, shingles, burning or painful urination   AND/OR:  * You no longer wish to have the surgery.  * Any other personal circumstances change that may lead to the need to cancel or defer this surgery.  *You were admitted to any hospital within one week of your planned procedure.     SMOKING:  *Quitting smoking can make a huge difference to your health and recovery from surgery.    *If you need help with quitting, call 0-595-QUIT-NOW.     SURGICAL TIME:  *You will be contacted between 2 p.m. and 3 p.m. the business day before your surgery with your arrival time.  *If you haven't received a call by 3pm, call [phone]  *Scheduled surgery times may change and you will be notified if this occurs-check your personal voicemail for any updates.     ON THE MORNING OF SURGERY:  *Wear comfortable, loose fitting clothing.   *Do not use moisturizers, creams, lotions or perfume.  *All jewelry and valuables should be left at home.  *Prosthetic devices such as contact lenses, hearing aids, dentures, eyelash extensions, hairpins and body piercing must be removed before surgery.     BRING WITH YOU:  *Photo ID and insurance card  *Current list of medications and allergies  *Pacemaker/Defibrillator/Heart stent cards  *CPAP machine and mask  *Slings/splints/crutches  *Copy of your complete Advanced Directive/DHPOA-if applicable  *Neurostimulator implant remote     PARKING AND ARRIVAL:  *Check in at the Main Entrance desk and let them know you are here for surgery.     IF YOU ARE HAVING OUTPATIENT/SAME DAY SURGERY:  *A responsible adult MUST accompany you at the time of discharge and stay with you for 24 hours after your surgery.  *You may NOT drive yourself home after surgery.  *You may use a taxi or ride sharing service ([employer], Uber) to return home ONLY if you are  accompanied by a friend or family member.  *Instructions for resuming your medications will be provided by your surgeon.        Soraida Bucio PA-C  P: [phone]  Department of Anesthesiology and Perioperative Medicine  --    Preoperative Fasting Guidelines    Why must I stop eating and drinking near surgery time?  With sedation, food or liquid in your stomach can enter your lungs causing serious complications  Increases nausea and vomiting    When do I need to stop eating and drinking before my surgery?  Do not eat any food after midnight the night before your surgery/procedure.  You may have up to 13.5 ounces of clear liquid until TWO hours before your instructed arrival time to the hospital.  This includes water, black tea/coffee, (no milk or cream) apple juice, and electrolyte drinks (Gatorade)  You may chew gum until TWO hours before your surgery/procedure              Preoperative Brain Exercises    What are brain exercises?  A brain exercise is any activity that engages your thinking (cognitive) skills.    What types of activities are considered brain exercises?  Jigsaw puzzles, crossword puzzles, word jumble, memory games, word search, and many more.  Many can be found free online or on your phone via a mobile sandra.    Why should I do brain exercises before my surgery?  More recent research has shown brain exercise before surgery can lower the risk of postoperative delirium (confusion) which can be especially important for older adults.  Patients who did brain exercises for 5 to 10 hours the days before surgery, cut their risk of postoperative delirium in half up to 1 week after surgery.                  [facility]    Preoperative Deep Breathing Exercises    Why it is important to do deep breathing exercises before my surgery?  Deep breathing exercises strengthen your breathing muscles.  This helps you to recover after your surgery and decreases the chance of breathing  complications.      How are the deep breathing exercises done?  Sit straight with your back supported.  Breathe in deeply and slowly through your nose. Your lower rib cage should expand and your abdomen may move forward.  Hold that breath for 3 to 5 seconds.  Breathe out through pursed lips, slowly and completely.  Rest and repeat 10 times every hour while awake.  Rest longer if you become dizzy or lightheaded.      Patient Information: Incentive Spirometer  What is an incentive spirometer?  An incentive spirometer is a device used before and after surgery to “exercise” your lungs.  It helps you to take deeper breaths to expand your lungs.  Below is an example of a basic incentive spirometer.  The device you receive may differ slightly but they all function the same.    Why do I need to use an incentive spirometer?  Using your incentive spirometer prepares your lungs for surgery and helps prevent lung problems after surgery.  How do I use my incentive spirometer?  When you're using your incentive spirometer, make sure to breathe through your mouth. If you breathe through your nose, the incentive spirometer won't work properly. You can hold your nose if you have trouble.  If you feel dizzy at any time, stop and rest. Try again at a later time.  Follow the steps below:  Set up your incentive spirometer, expand the flexible tubing and connect to the outlet.  Sit upright in a chair or bed. Hold the incentive spirometer at eye level.   Put the mouthpiece in your mouth and close your lips tightly around it. Slowly breathe out (exhale) completely.  Breathe in (inhale) slowly through your mouth as deeply as you can. As you take a breath, you will see the piston rise inside the large column. While the piston rises, the indicator should move upwards. It should stay in between the 2 arrows (see Figure).  Try to get the piston as high as you can, while keeping the indicator between the arrows.   If the indicator doesn't stay  between the arrows, you're breathing either too fast or too slow.  When you get it as high as you can, hold your breath for 10 seconds, or as long as possible. While you're holding your breath, the piston will slowly fall to the base of the spirometer.  Once the piston reaches the bottom of the spirometer, breathe out slowly through your mouth. Rest for a few seconds.  Repeat 10 times. Try to get the piston to the same level with each breath.  Repeat every hour while awake  You can carefully clean the outside of the mouthpiece with an alcohol wipe or soap and water.            Patient and Family Education             Ways You Can Help Prevent Blood Clots             This handout explains some simple things you can do to help prevent blood clots.      Blood clots are blockages that can form in the body's veins. When a blood clot forms in your deep veins, it may be called a deep vein thrombosis, or DVT for short. Blood clots can happen in any part of the body where blood flows, but they are most common in the arms and legs. If a piece of a blood clot breaks free and travels to the lungs, it is called a pulmonary embolus (PE). A PE can be a very serious problem.         Being in the hospital or having surgery can raise your chances of getting a blood clot because you may not be well enough to move around as much as you normally do.         Ways you can help prevent blood clots in the hospital         Wearing SCDs. SCDs stands for Sequential Compression Devices.   SCDs are special sleeves that wrap around your legs  They attach to a pump that fills them with air to gently squeeze your legs every few minutes.   This helps return the blood in your legs to your heart.   SCDs should only be taken off when walking or bathing.   SCDs may not be comfortable, but they can help save your life.               Wearing compression stockings - if your doctor orders them. These special snug fitting stockings gently squeeze your legs  to help blood flow.       Walking. Walking helps move the blood in your legs.   If your doctor says it is ok, try walking the halls at least   5 times a day. Ask us to help you get up, so you don't fall.      Taking any blood thinning medicines your doctor orders.             [facility]; 3/23       Ways you can help prevent blood clots at home       Wearing compression stockings - if your doctor orders them. ? Walking - to help move the blood in your legs.       Taking any blood thinning medicines your doctor orders.      Signs of a blood clot or PE      Tell your doctor or nurse know right away if you have of the problems listed below.    If you are at home, seek medical care right away. Call 911 for chest pain or problems breathing.               Signs of a blood clot (DVT) - such as pain,  swelling, redness or warmth in your arm or leg      Signs of a pulmonary embolism (PE) - such as chest     pain or feeling short of breath           The Week before Surgery        Seven days before Surgery  Check your [facility] medication instructions  Do the exercises provided to you by [facility]   Arrange for a responsible, adult licensed  to take you home after surgery and stay with you for 24 hours.  You will not be permitted to drive yourself home if you have received any anesthetic/sedation  Six days before surgery  Check your [facility] medication instructions  Do the exercises provided to you by [facility]   Start using Chlorhexidene (CHG) body wash if prescribed  Five days before surgery  Check your [facility] medication instructions  Do the exercises provided to you by [facility]   Continue to use CHG body wash if prescribed  Three days before surgery  Check your [facility] medication instructions  Do the exercises provided to you by [facility]   Continue to use CHG body wash if prescribed  Two days before surgery  Check your [facility] medication instructions  Do the exercises provided to you by [facility]   Continue to use CHG body wash if prescribed    The Day  before Surgery       Check your [facility] medication and all other [facility] instructions including when to stop eating and drinking  You will be called with your arrival time for surgery in the late afternoon.  If you do not receive a call please reach out to your surgeon's office.  Do not smoke or drink 24 hours before surgery  Prepare items to bring with you to the hospital  Shower with your chlorhexidine wash if prescribed  Brush your teeth and use your chlorhexidine dental rinse if prescribed    The Day of Surgery       Check your [facility] medication instructions  Ensure you follow the instructions for when to stop eating and drinking  Shower, if prescribed use CHG.  Do not apply any lotions, creams, moisturizers, perfume or deodorant  Brush your teeth and use your CHG dental rinse if prescribed  Wear loose comfortable clothing  Avoid make-up  Remove  jewelry and piercings, consider professional piercing removal with a plastic spacer if needed  Bring photo ID and Insurance card  Bring an accurate medication list that includes medication dose, frequency and allergies  Bring a copy of your advanced directives (will, health care power of )  Bring any devices and controllers as well as medical devices you have been provided with for surgery (CPAP, slings, braces, etc.)  Dentures, eyeglasses, and contacts will be removed before surgery, please bring cases for contacts or glasses        Patient Information: Pre-Operative Infection Prevention Measures     Why did I have my nose, under my arms, and groin swabbed?  The purpose of the swab is to identify Staphylococcus aureus inside your nose or on your skin.  The swab was sent to the laboratory for culture.  A positive swab/culture for Staphylococcus aureus is called colonization or carriage.      What is Staphylococcus aureus?  Staphylococcus aureus, also known as “staph”, is a germ found on the skin or in the nose of healthy people.  Sometimes Staphylococcus aureus can get  into the body and cause an infection.  This can be minor (such as pimples, boils, or other skin problems).  It might also be serious (such as a blood infection, pneumonia, or a surgical site infection).    What is Staphylococcus aureus colonization or carriage?  Colonization or carriage means that a person has the germ but is not sick from it.  These bacteria can be spread on the hands or when breathing or sneezing.    How is Staphylococcus aureus spread?  It is most often spread by close contact with a person or item that carries it.    What happens if my culture is positive for Staphylococcus aureus?  Your doctor/medical team will use this information to guide any antibiotic treatment which may be necessary.  Regardless of the culture results, we will clean the inside of your nose with a betadine swab just before you have your surgery.      Will I get an infection if I have Staphylococcus aureus in my nose or on my skin?  Anyone can get an infection with Staphylococcus aureus.  However, the best way to reduce your risk of infection is to follow the instructions provided to you for the use of your CHG soap and dental rinse.        Patient Information: Oral/Dental Rinse    What is oral/dental rinse?   It is a mouthwash. It is a way of cleaning the mouth with a germ-killing solution before your surgery.  The solution contains chlorhexidine, commonly known as CHG.   It is used inside the mouth to kill a bacteria known as Staphylococcus aureus.  Let your doctor know if you are allergic to Chlorhexidine.    Why do I need to use CHG oral/dental rinse?  The CHG oral/dental rinse helps to kill a bacteria in your mouth known as Staphylococcus aureus.     This reduces the risk of infection at the surgical site.      Using your CHG oral/dental rinse  STEPS:  Use your CHG oral/dental rinse after you brush your teeth the night before (at bedtime) and the morning of your surgery.  Follow all directions on your prescription  label.    Use the cap on the container to measure 15ml   Swish (gargle if you can) the mouthwash in your mouth for at least 30 seconds, (do not swallow) and spit out  After you use your CHG rinse, do not rinse your mouth with water, drink or eat.  Please refer to the prescription label for the appropriate time to resume oral intake      What side effects might I have using the CHG oral/dental rinse?  CHG rinse will stick to plaque on the teeth.  Brush and floss just before use.  Teeth brushing will help avoid staining of plaque during use.      Patient Information: Home Preoperative Antibacterial Shower      What is a home preoperative antibacterial shower?  This shower is a way of cleaning the skin with a germ-killing solution before surgery.  The solution contains chlorhexidine, commonly known as CHG.  CHG is a skin cleanser with germ-killing ability.  Let your doctor know if you are allergic to chlorhexidine.    Why do I need to take a preoperative antibacterial shower?  Skin is not sterile.  It is best to try to make your skin as free of germs as possible before surgery.  Proper cleansing with a germ-killing soap before surgery can lower the number of germs on your skin.  This helps to reduce the risk of infection at the surgical site.  Following the instructions listed below will help you prepare your skin for surgery.      How do I use the solution?  Steps:  Begin using your CHG soap 5 days before your scheduled surgery on _______3/29/25_________________.    First, wash and rinse your hair using the CHG soap. Keep CHG soap away from ear canals and eyes.  Rinse completely, do not condition.  Hair extensions should be removed.  Wash your face with your normal soap and rinse.    Apply the CHG solution to a clean wet washcloth.  Turn the water off or move away from the water spray to avoid premature rinsing of the CHG soap as you are applying.   Firmly lather your entire body from the neck down.  Do not use on  your face.  Pay special attention to the area(s) where your incision(s) will be located unless they are on your face.  Avoid scrubbing your skin too hard.  The important point is to have the CHG soap sit on your skin for 3 minutes.    When the 3 minutes are up, turn on the water and rinse the CHG solution off your body completely.   DO NOT wash with regular soap after you have used the CHG soap solution  Pat yourself dry with a clean, freshly-laundered towel.  DO NOT apply powders, deodorants, or lotions.  Dress in clean, freshly laundered nightclothes.    Be sure to sleep with clean, freshly laundered sheets.  Be aware that CHG will cause stains on fabrics; if you wash them with bleach after use.  Rinse your washcloth and other linens that have contact with CHG completely.  Use only non-chlorine detergents to launder the items used.   The morning of surgery is the fifth day.  Repeat the above steps and dress in clean comfortable clothing     Whom should I contact if I have any questions regarding the use of CHG soap?  Call the [facility]

## 2025-03-14 NOTE — H&P (VIEW-ONLY)
CPM/PAT Evaluation       Name: Tye Rowley (Tye Rowley)  /Age: 1965/59 y.o.     Visit Type:   In-Person       Chief Complaint: right knee pain    HPI : Patient is a 58 yo M scheduled for right total knee replacement on 25 with Dr. Lisa secondary to right knee osteoarthritis. Pt notes sharp right knee pain with every step. Patient was referred by Dr. Lisa to CPM today for perioperative risk stratification and optimization. Patient's PMHx is notable for HLD, GERD      Past Medical History:   Diagnosis Date    Arthritis     GERD (gastroesophageal reflux disease)     History of blood transfusion     at age 19    Hyperlipidemia        Past Surgical History:   Procedure Laterality Date    COLONOSCOPY      KNEE ARTHROPLASTY Left 2017    UPPER GASTROINTESTINAL ENDOSCOPY         Patient  has no history on file for sexual activity.    Family History   Problem Relation Name Age of Onset    Alcohol abuse Mother Hetal     Heart attack Father  60    Heart disease Maternal Grandfather Donte        No Known Allergies    Prior to Admission medications    Medication Sig Start Date End Date Taking? Authorizing Provider   ascorbic acid (Vitamin C) 250 mg tablet Take 1 tablet (250 mg) by mouth once daily.   Yes Historical Provider, MD   cholecalciferol (Vitamin D3) 25 mcg (1000 units) tablet Take 1 tablet (1,000 Units) by mouth once daily.   Yes Historical Provider, MD   ibuprofen 800 mg tablet Take 1 tablet (800 mg) by mouth every 8 hours if needed for mild pain (1 - 3).   Yes Historical Provider, MD   ZINC ACETATE ORAL Use in the mouth or throat.   Yes Historical Provider, MD   atorvastatin (Lipitor) 20 mg tablet     Historical Provider, MD   chlorhexidine (Hibiclens) 4 % external liquid Apply topically once daily for 5 days. 3/14/25 3/19/25  Soraida Bucio PA-C   chlorhexidine (Peridex) 0.12 % solution Swish and spit 15 mL night before surgery and morning of surgery 3/14/25   Soraida Bucio PA-C    omeprazole (PriLOSEC) 20 mg DR capsule Take 1 capsule (20 mg) by mouth 2 times a day.  Patient not taking: Reported on 3/14/2025 11/6/23   Historical Provider, MD HANSON ROS:   Constitutional:   neg    Neuro/Psych:   neg    Eyes:   neg    Ears:   neg    Nose:   neg    Mouth:   neg    Throat:   neg    Neck:   neg    Cardio:   neg    Respiratory:   neg    Endocrine:   neg    GI:   neg    :   neg    Musculoskeletal:    +Right knee pain  Hematologic:   neg    Skin:  neg        Physical Exam  Vitals and nursing note reviewed.   Constitutional:       General: He is not in acute distress.     Appearance: He is normal weight. He is not ill-appearing or toxic-appearing.   HENT:      Head: Normocephalic and atraumatic.      Right Ear: External ear normal.      Left Ear: External ear normal.      Nose: Nose normal.      Mouth/Throat:      Mouth: Mucous membranes are moist.   Eyes:      Extraocular Movements: Extraocular movements intact.      Conjunctiva/sclera: Conjunctivae normal.   Neck:      Vascular: No carotid bruit.   Cardiovascular:      Rate and Rhythm: Normal rate and regular rhythm.      Pulses: Normal pulses.      Heart sounds: Normal heart sounds.   Pulmonary:      Effort: Pulmonary effort is normal.      Breath sounds: Normal breath sounds.   Abdominal:      General: There is no distension.      Palpations: Abdomen is soft.      Tenderness: There is no abdominal tenderness.   Musculoskeletal:         General: Normal range of motion.      Cervical back: Normal range of motion.   Skin:     General: Skin is warm and dry.      Capillary Refill: Capillary refill takes less than 2 seconds.   Neurological:      General: No focal deficit present.      Mental Status: He is alert.   Psychiatric:         Mood and Affect: Mood normal.         Behavior: Behavior normal.          PAT AIRWAY:   Airway:     Mallampati::  I    TM distance::  >3 FB    Neck ROM::  Full             Visit Vitals  BP (!) 136/91   Pulse 63  "  Temp 36.7 °C (98.1 °F)   Resp 16   Ht 1.778 m (5' 10\")   Wt 77.6 kg (171 lb)   SpO2 100%   BMI 24.54 kg/m²   Smoking Status Former   BSA 1.96 m²       DASI Risk Score      Flowsheet Row Pre-Admission Testing from 3/14/2025 in [facility] Questionnaire Series Submission from 2/11/2025 in [facility] with Generic Provider Yamilka   Can you take care of yourself (eat, dress, bathe, or use toilet)?  2.75 filed at 03/14/2025 0810 2.75  filed at 02/11/2025 1045   Can you walk indoors, such as around your house? 1.75 filed at 03/14/2025 0810 1.75  filed at 02/11/2025 1045   Can you walk a block or two on level ground?  2.75 filed at 03/14/2025 0810 2.75  filed at 02/11/2025 1045   Can you climb a flight of stairs or walk up a hill? 5.5 filed at 03/14/2025 0810 5.5  filed at 02/11/2025 1045   Can you run a short distance? 0 filed at 03/14/2025 0810 0  filed at 02/11/2025 1045   Can you do light work around the house like dusting or washing dishes? 2.7 filed at 03/14/2025 0810 2.7  filed at 02/11/2025 1045   Can you do moderate work around the house like vacuuming, sweeping floors or carrying groceries? 3.5 filed at 03/14/2025 0810 3.5  filed at 02/11/2025 1045   Can you do heavy work around the house like scrubbing floors or lifting and moving heavy furniture?  8 filed at 03/14/2025 0810 8  filed at 02/11/2025 1045   Can you do yard work like raking leaves, weeding or pushing a mower? 4.5 filed at 03/14/2025 0810 4.5  filed at 02/11/2025 1045   Can you have sexual relations? 5.25 filed at 03/14/2025 0810 5.25  filed at 02/11/2025 1045   Can you participate in moderate recreational activities like golf, bowling, dancing, doubles tennis or throwing a baseball or football? 6 filed at 03/14/2025 0810 6  filed at 02/11/2025 1045   Can you participate in strenous sports like swimming, singles tennis, football, basketball, or skiing? 0 filed at 03/14/2025 0810 0  filed at 02/11/2025 1045   DASI SCORE 42.7 filed " at 03/14/2025 0810 42.7  filed at 02/11/2025 1045   METS Score (Will be calculated only when all the questions are answered) 8 filed at 03/14/2025 0810 8  filed at 02/11/2025 1045          Caprini DVT Assessment      Flowsheet Row Pre-Admission Testing from 3/14/2025 in [facility]   DVT Score (IF A SCORE IS NOT CALCULATING, MUST SELECT A BMI TO COMPLETE) 7 filed at 03/14/2025 0757   Surgical Factors Elective major lower extremity arthroplasty filed at 03/14/2025 0757   BMI (BMI MUST BE CHOSEN) 30 or less filed at 03/14/2025 0757          Modified Frailty Index    No data to display       [id number] Stroke Risk Points  Current as of 2 minutes ago        N/A 0 to 9 Points:      Last Change: N/A          The [id number] risk score (Lip LUDY, et al. 2009. © 2010 [employer]) quantifies the risk of stroke for a patient with atrial fibrillation. For patients without atrial fibrillation or under the age of 18 this score appears as N/A. Higher score values generally indicate higher risk of stroke.        This score is not applicable to this patient. Components are not calculated.          Revised Cardiac Risk Index      Flowsheet Row Pre-Admission Testing from 3/14/2025 in [facility]   High-Risk Surgery (Intraperitoneal, Intrathoracic,Suprainguinal vascular) 0 filed at 03/14/2025 0810   History of ischemic heart disease (History of MI, History of positive exercuse test, Current chest paint considered due to myocardial ischemia, Use of nitrate therapy, ECG with pathological Q Waves) 0 filed at 03/14/2025 0810   History of congestive heart failure (pulmonary edemia, bilateral rales or S3 gallop, Paroxysmal nocturnal dyspnea, CXR showing pulmonary vascular redistribution) 0 filed at 03/14/2025 0810   History of cerebrovascular disease (Prior TIA or stroke) 0 filed at 03/14/2025 0810   Pre-operative insulin treatment 0 filed at 03/14/2025 0810   Pre-operative  creatinine>2 mg/dl 0 filed at 03/14/2025 0810   Revised Cardiac Risk Calculator 0 filed at 03/14/2025 0810          Apfel Simplified Score      Flowsheet Row Pre-Admission Testing from 3/14/2025 in [facility]   Smoking status 1 filed at 03/14/2025 0810   History of motion sickness or PONV  0 filed at 03/14/2025 0810   Use of postoperative opioids 0 filed at 03/14/2025 0810   Gender - Female 0=No filed at 03/14/2025 0810   Apfel Simplified Score Calculator 1 filed at 03/14/2025 0810          Risk Analysis Index Results This Encounter    No data found in the last 10 encounters.       Stop Bang Score      Flowsheet Row Pre-Admission Testing from 3/14/2025 in [facility] Questionnaire Series Submission from 2/11/2025 in [facility] with Generic Provider Yamilka   Do you snore loudly? 1  [occasionally] filed at 03/14/2025 0747 1  filed at 02/11/2025 1045   Do you often feel tired or fatigued after your sleep? 1 filed at 03/14/2025 0747 1  filed at 02/11/2025 1045   Has anyone ever observed you stop breathing in your sleep? 0 filed at 03/14/2025 0747 1  filed at 02/11/2025 1045   Do you have or are you being treated for high blood pressure? 0 filed at 03/14/2025 0747 0  filed at 02/11/2025 1045   Recent BMI (Calculated) 24.5 filed at 03/14/2025 0747 23.7  filed at 02/11/2025 1045   Is BMI greater than 35 kg/m2? 0=No filed at 03/14/2025 0747 0=No  filed at 02/11/2025 1045   Age older than 50 years old? 1=Yes filed at 03/14/2025 0747 1=Yes  filed at 02/11/2025 1045   Is your neck circumference greater than 17 inches (Male) or 16 inches (Female)? 1 filed at 03/14/2025 0747 --   Gender - Male 1=Yes filed at 03/14/2025 0747 1=Yes  filed at 02/11/2025 1045   STOP-BANG Total Score 5 filed at 03/14/2025 0747 --          Prodigy: High Risk  Total Score: 8              Prodigy Gender Score          ARISCAT Score for Postoperative Pulmonary Complications      Flowsheet Row Pre-Admission Testing from  3/14/2025 in [facility]   Age Calculated Score 3 filed at 03/14/2025 0759   Preoperative SpO2 0 filed at 03/14/2025 0759   Respiratory infection in the last month Either upper or lower (i.e., URI, bronchitis, pneumonia), with fever and antibiotic treatment 0 filed at 03/14/2025 0759   Preoperative anemia (Hgb less than 10 g/dl) 0 filed at 03/14/2025 0759   Surgical incision  0 filed at 03/14/2025 0759   Duration of surgery  16 filed at 03/14/2025 0759   Emergency Procedure  0 filed at 03/14/2025 0759   ARISCAT Total Score  19 filed at 03/14/2025 0759          [place] Perioperative Risk for Myocardial Infarction or Cardiac Arrest (POLI)      Flowsheet Row Pre-Admission Testing from 3/14/2025 in [facility]   Calculated Age Score 1.16 filed at 03/14/2025 0759   Functional Status  0 filed at 03/14/2025 0759   ASA Class  -3.29 filed at 03/14/2025 0759   Creatinine 0 filed at 03/14/2025 0759   Type of Procedure  0.80 filed at 03/14/2025 0759   POLI Total Score  -6.58 filed at 03/14/2025 0759   POLI % 0.14 filed at 03/14/2025 0759            Assessment & Plan    Neuro:   No diagnosis or significant findings on chart review or clinical presentation and evaluation.    Preoperative brain exercise educational handout provided to patient.    The patient is at an increased risk for perioperative stroke secondary to HLD.    HEENT/Airway:   No diagnosis or significant findings on chart review or clinical presentation and evaluation.   STOP-BANG Score- 5 points high risk for JEFF  Advised to discuss testing for sleep apnea w/ PCP    Mallampati::  I    TM distance::  >3 FB    Neck ROM::  Full  Dentures-denies  Crowns-reports  Implants-denies    Cardiovascular:    -HLD - on statin, has been noncompliant  No additional preoperative testing is currently indicated.  METS: 8  RCRI: 0 points, 3.9%    30 day risk of MACE (risk for cardiac death, nonfatal myocardial infarction, and nonfactal cardiac  arrest  POLI:   0.14 % risk of intraoperative or 30-day postoperative MACE  EKG -normal EKG, normal sinus rhythm Rate 67- No acute changes      Pulmonary:     No diagnosis or significant findings on chart review or clinical presentation and evaluation.   ARISCAT: <26 points, 1.6% risk of in-hospital postoperative pulmonary complication  PRODIGY: High risk for opioid induced respiratory depression  Smoking History-He quit smoking approximately 15 years ago.  Preoperative deep breathing educational handout provided to patient.    Renal:  No diagnosis or significant findings on chart review or clinical presentation and evaluation, however, the patient is at increased risk of perioperative renal complications secondary to age>/= 56 and male sex. Preventative measures include BP monitoring, medication compliance, and hydration management.   CMP-unremarkable    Endocrine:  No diagnosis or significant findings on chart review or clinical presentation and evaluation.   A1c 4.7    Hematologic:    No diagnosis or significant findings on chart review or clinical presentation and evaluation.  The patient is not a Jehovah’s witness and will accept blood and blood products if medically indicated.   History of previous blood transfusions Yes - at age 19  CBC- unremarkable    Caprini Score 7, patient at Moderate for postoperative DVT. Pt supplied education/VTE handout  Anticoagulation use: No   Preoperative DVT educational handout provided to patient.    Gastrointestinal:     -GERD  Recreational drug use: Drug use No  Alcohol use > 5 beers per week    Apfel: 1 points 21% risk for post operative N/V    Infectious disease:    No diagnosis or significant findings on chart review or clinical presentation and evaluation.   Prescription provided for CHG body wash and dental rinse. CHG use instructions reviewed and provided to patient. Patient advised to call [facility] if rx not received.   Staph screen collected- negative for  MRSA    Musculoskeletal:    -osteoarthritis of right knee- scheduled for surgery      Anesthesia:  ASA 2 - Patient with mild systemic disease with no functional limitations    History of General anesthesia- yes  Complications- No anesthesia complications  No family history of anesthesia complications    Nickel/metal allergy-negative  Shellfish allergy-negative    Discussed with patient medication instructions, NPO guidelines, and any questions or concerns. Patient does not need further workup prior to preceding with elective surgery based on based on risk assessment.       Soraida Bucio PA-C 3/14/2025 8:26 AM

## 2025-03-16 LAB — STAPHYLOCOCCUS SPEC CULT: ABNORMAL

## 2025-04-01 ENCOUNTER — ANESTHESIA EVENT (OUTPATIENT)
Dept: OPERATING ROOM | Facility: HOSPITAL | Age: 60
End: 2025-04-01
Payer: COMMERCIAL

## 2025-04-02 ENCOUNTER — PHARMACY VISIT (OUTPATIENT)
Dept: PHARMACY | Facility: CLINIC | Age: 60
End: 2025-04-02
Payer: COMMERCIAL

## 2025-04-02 ENCOUNTER — HOSPITAL ENCOUNTER (OUTPATIENT)
Facility: HOSPITAL | Age: 60
Discharge: HOME HEALTH | End: 2025-04-03
Attending: ORTHOPAEDIC SURGERY | Admitting: ORTHOPAEDIC SURGERY
Payer: COMMERCIAL

## 2025-04-02 ENCOUNTER — ANESTHESIA (OUTPATIENT)
Dept: OPERATING ROOM | Facility: HOSPITAL | Age: 60
End: 2025-04-02
Payer: COMMERCIAL

## 2025-04-02 ENCOUNTER — HOME HEALTH ADMISSION (OUTPATIENT)
Dept: HOME HEALTH SERVICES | Facility: HOME HEALTH | Age: 60
End: 2025-04-02
Payer: COMMERCIAL

## 2025-04-02 ENCOUNTER — DOCUMENTATION (OUTPATIENT)
Dept: HOME HEALTH SERVICES | Facility: HOME HEALTH | Age: 60
End: 2025-04-02

## 2025-04-02 DIAGNOSIS — M17.11 UNILATERAL PRIMARY OSTEOARTHRITIS, RIGHT KNEE: ICD-10-CM

## 2025-04-02 DIAGNOSIS — M17.11 PRIMARY OSTEOARTHRITIS OF RIGHT KNEE: Primary | ICD-10-CM

## 2025-04-02 PROCEDURE — A4649 SURGICAL SUPPLIES: HCPCS | Performed by: ORTHOPAEDIC SURGERY

## 2025-04-02 PROCEDURE — 2500000001 HC RX 250 WO HCPCS SELF ADMINISTERED DRUGS (ALT 637 FOR MEDICARE OP): Performed by: PHYSICIAN ASSISTANT

## 2025-04-02 PROCEDURE — A27447 PR TOTAL KNEE ARTHROPLASTY

## 2025-04-02 PROCEDURE — 97162 PT EVAL MOD COMPLEX 30 MIN: CPT | Mod: GP

## 2025-04-02 PROCEDURE — 2500000005 HC RX 250 GENERAL PHARMACY W/O HCPCS: Performed by: PHYSICIAN ASSISTANT

## 2025-04-02 PROCEDURE — 2500000005 HC RX 250 GENERAL PHARMACY W/O HCPCS

## 2025-04-02 PROCEDURE — 2500000004 HC RX 250 GENERAL PHARMACY W/ HCPCS (ALT 636 FOR OP/ED): Performed by: ANESTHESIOLOGY

## 2025-04-02 PROCEDURE — A27447 PR TOTAL KNEE ARTHROPLASTY: Performed by: STUDENT IN AN ORGANIZED HEALTH CARE EDUCATION/TRAINING PROGRAM

## 2025-04-02 PROCEDURE — 2500000004 HC RX 250 GENERAL PHARMACY W/ HCPCS (ALT 636 FOR OP/ED): Performed by: PHYSICIAN ASSISTANT

## 2025-04-02 PROCEDURE — 7100000011 HC EXTENDED STAY RECOVERY HOURLY - NURSING UNIT

## 2025-04-02 PROCEDURE — 2500000004 HC RX 250 GENERAL PHARMACY W/ HCPCS (ALT 636 FOR OP/ED): Performed by: ORTHOPAEDIC SURGERY

## 2025-04-02 PROCEDURE — 3600000005 HC OR TIME - INITIAL BASE CHARGE - PROCEDURE LEVEL FIVE: Performed by: ORTHOPAEDIC SURGERY

## 2025-04-02 PROCEDURE — 27447 TOTAL KNEE ARTHROPLASTY: CPT | Performed by: ORTHOPAEDIC SURGERY

## 2025-04-02 PROCEDURE — 7100000001 HC RECOVERY ROOM TIME - INITIAL BASE CHARGE: Performed by: ORTHOPAEDIC SURGERY

## 2025-04-02 PROCEDURE — 2500000004 HC RX 250 GENERAL PHARMACY W/ HCPCS (ALT 636 FOR OP/ED): Mod: JZ | Performed by: STUDENT IN AN ORGANIZED HEALTH CARE EDUCATION/TRAINING PROGRAM

## 2025-04-02 PROCEDURE — 3700000001 HC GENERAL ANESTHESIA TIME - INITIAL BASE CHARGE: Performed by: ORTHOPAEDIC SURGERY

## 2025-04-02 PROCEDURE — 3600000010 HC OR TIME - EACH INCREMENTAL 1 MINUTE - PROCEDURE LEVEL FIVE: Performed by: ORTHOPAEDIC SURGERY

## 2025-04-02 PROCEDURE — 7100000002 HC RECOVERY ROOM TIME - EACH INCREMENTAL 1 MINUTE: Performed by: ORTHOPAEDIC SURGERY

## 2025-04-02 PROCEDURE — 2500000004 HC RX 250 GENERAL PHARMACY W/ HCPCS (ALT 636 FOR OP/ED)

## 2025-04-02 PROCEDURE — 64447 NJX AA&/STRD FEMORAL NRV IMG: CPT | Performed by: ANESTHESIOLOGY

## 2025-04-02 PROCEDURE — 2780000003 HC OR 278 NO HCPCS: Performed by: ORTHOPAEDIC SURGERY

## 2025-04-02 PROCEDURE — 2720000007 HC OR 272 NO HCPCS: Performed by: ORTHOPAEDIC SURGERY

## 2025-04-02 PROCEDURE — 99222 1ST HOSP IP/OBS MODERATE 55: CPT | Performed by: EMERGENCY MEDICINE

## 2025-04-02 PROCEDURE — C1713 ANCHOR/SCREW BN/BN,TIS/BN: HCPCS | Performed by: ORTHOPAEDIC SURGERY

## 2025-04-02 PROCEDURE — 97530 THERAPEUTIC ACTIVITIES: CPT | Mod: GP

## 2025-04-02 PROCEDURE — RXMED WILLOW AMBULATORY MEDICATION CHARGE

## 2025-04-02 PROCEDURE — C1776 JOINT DEVICE (IMPLANTABLE): HCPCS | Performed by: ORTHOPAEDIC SURGERY

## 2025-04-02 PROCEDURE — 97110 THERAPEUTIC EXERCISES: CPT | Mod: GP

## 2025-04-02 PROCEDURE — 3700000002 HC GENERAL ANESTHESIA TIME - EACH INCREMENTAL 1 MINUTE: Performed by: ORTHOPAEDIC SURGERY

## 2025-04-02 DEVICE — ATTUNE KNEE SYSTEM FEMORAL POSTERIOR STABILIZED SIZE 8 RIGHT CEMENTED
Type: IMPLANTABLE DEVICE | Site: KNEE | Status: FUNCTIONAL
Brand: ATTUNE

## 2025-04-02 DEVICE — TIBAL BASE ATTUNE FB, SZ 7 CEM: Type: IMPLANTABLE DEVICE | Site: KNEE | Status: FUNCTIONAL

## 2025-04-02 DEVICE — ATTUNE PATELLA MEDIALIZED DOME 41MM CEMENTED AOX
Type: IMPLANTABLE DEVICE | Site: KNEE | Status: FUNCTIONAL
Brand: ATTUNE

## 2025-04-02 DEVICE — ATTUNE KNEE SYSTEM TIBIAL INSERT FIXED BEARING POSTERIOR STABILIZED 8 7MM AOX
Type: IMPLANTABLE DEVICE | Site: KNEE | Status: FUNCTIONAL
Brand: ATTUNE

## 2025-04-02 DEVICE — SMARTSET HV HIGH VISCOSITY BONE CEMENT 40G
Type: IMPLANTABLE DEVICE | Site: KNEE | Status: FUNCTIONAL
Brand: SMARTSET

## 2025-04-02 RX ORDER — TRAMADOL HYDROCHLORIDE 50 MG/1
50 TABLET ORAL EVERY 6 HOURS PRN
Qty: 28 TABLET | Refills: 0 | Status: SHIPPED | OUTPATIENT
Start: 2025-04-02 | End: 2025-04-09

## 2025-04-02 RX ORDER — MELOXICAM 7.5 MG/1
7.5 TABLET ORAL ONCE
Status: COMPLETED | OUTPATIENT
Start: 2025-04-02 | End: 2025-04-02

## 2025-04-02 RX ORDER — OXYCODONE HYDROCHLORIDE 5 MG/1
5 TABLET ORAL EVERY 6 HOURS PRN
Status: DISCONTINUED | OUTPATIENT
Start: 2025-04-02 | End: 2025-04-03 | Stop reason: HOSPADM

## 2025-04-02 RX ORDER — ACETAMINOPHEN 325 MG/1
975 TABLET ORAL ONCE
Status: COMPLETED | OUTPATIENT
Start: 2025-04-02 | End: 2025-04-02

## 2025-04-02 RX ORDER — ASPIRIN 81 MG/1
81 TABLET ORAL 2 TIMES DAILY
Qty: 60 TABLET | Refills: 0 | Status: SHIPPED | OUTPATIENT
Start: 2025-04-02 | End: 2025-05-02

## 2025-04-02 RX ORDER — MELOXICAM 15 MG/1
15 TABLET ORAL DAILY
Qty: 30 TABLET | Refills: 0 | Status: SHIPPED | OUTPATIENT
Start: 2025-04-02 | End: 2025-05-02

## 2025-04-02 RX ORDER — METOCLOPRAMIDE HYDROCHLORIDE 5 MG/ML
10 INJECTION INTRAMUSCULAR; INTRAVENOUS EVERY 6 HOURS PRN
Status: DISCONTINUED | OUTPATIENT
Start: 2025-04-02 | End: 2025-04-03 | Stop reason: HOSPADM

## 2025-04-02 RX ORDER — HYDROMORPHONE HYDROCHLORIDE 0.2 MG/ML
0.2 INJECTION INTRAMUSCULAR; INTRAVENOUS; SUBCUTANEOUS EVERY 5 MIN PRN
Status: DISCONTINUED | OUTPATIENT
Start: 2025-04-02 | End: 2025-04-02 | Stop reason: HOSPADM

## 2025-04-02 RX ORDER — OXYCODONE HYDROCHLORIDE 5 MG/1
10 TABLET ORAL EVERY 4 HOURS PRN
Status: DISCONTINUED | OUTPATIENT
Start: 2025-04-02 | End: 2025-04-02 | Stop reason: HOSPADM

## 2025-04-02 RX ORDER — ACETAMINOPHEN 325 MG/1
650 TABLET ORAL EVERY 6 HOURS SCHEDULED
Status: DISCONTINUED | OUTPATIENT
Start: 2025-04-02 | End: 2025-04-03 | Stop reason: HOSPADM

## 2025-04-02 RX ORDER — BISACODYL 5 MG
10 TABLET, DELAYED RELEASE (ENTERIC COATED) ORAL DAILY PRN
Status: DISCONTINUED | OUTPATIENT
Start: 2025-04-02 | End: 2025-04-03 | Stop reason: HOSPADM

## 2025-04-02 RX ORDER — ROPIVACAINE/EPI/CLONIDINE/KET 2.46-0.005
SYRINGE (ML) INJECTION AS NEEDED
Status: DISCONTINUED | OUTPATIENT
Start: 2025-04-02 | End: 2025-04-02 | Stop reason: HOSPADM

## 2025-04-02 RX ORDER — ONDANSETRON HYDROCHLORIDE 2 MG/ML
4 INJECTION, SOLUTION INTRAVENOUS ONCE AS NEEDED
Status: DISCONTINUED | OUTPATIENT
Start: 2025-04-02 | End: 2025-04-02 | Stop reason: HOSPADM

## 2025-04-02 RX ORDER — METHOCARBAMOL 100 MG/ML
1000 INJECTION, SOLUTION INTRAMUSCULAR; INTRAVENOUS ONCE
Status: COMPLETED | OUTPATIENT
Start: 2025-04-02 | End: 2025-04-02

## 2025-04-02 RX ORDER — ONDANSETRON 4 MG/1
4 TABLET, ORALLY DISINTEGRATING ORAL EVERY 8 HOURS PRN
Status: DISCONTINUED | OUTPATIENT
Start: 2025-04-02 | End: 2025-04-03 | Stop reason: HOSPADM

## 2025-04-02 RX ORDER — PROPOFOL 10 MG/ML
INJECTION, EMULSION INTRAVENOUS CONTINUOUS PRN
Status: DISCONTINUED | OUTPATIENT
Start: 2025-04-02 | End: 2025-04-02

## 2025-04-02 RX ORDER — SCOPOLAMINE 1 MG/3D
1 PATCH, EXTENDED RELEASE TRANSDERMAL
Status: DISCONTINUED | OUTPATIENT
Start: 2025-04-02 | End: 2025-04-02

## 2025-04-02 RX ORDER — PANTOPRAZOLE SODIUM 40 MG/1
40 TABLET, DELAYED RELEASE ORAL
Status: DISCONTINUED | OUTPATIENT
Start: 2025-04-03 | End: 2025-04-03 | Stop reason: HOSPADM

## 2025-04-02 RX ORDER — BISACODYL 10 MG/1
10 SUPPOSITORY RECTAL DAILY PRN
Status: DISCONTINUED | OUTPATIENT
Start: 2025-04-02 | End: 2025-04-03 | Stop reason: HOSPADM

## 2025-04-02 RX ORDER — OXYCODONE HYDROCHLORIDE 5 MG/1
10 TABLET ORAL EVERY 4 HOURS PRN
Status: DISCONTINUED | OUTPATIENT
Start: 2025-04-02 | End: 2025-04-03 | Stop reason: HOSPADM

## 2025-04-02 RX ORDER — POLYETHYLENE GLYCOL 3350 17 G/17G
17 POWDER, FOR SOLUTION ORAL DAILY
Qty: 30 PACKET | Refills: 0 | Status: SHIPPED | OUTPATIENT
Start: 2025-04-02 | End: 2025-05-02

## 2025-04-02 RX ORDER — ONDANSETRON HYDROCHLORIDE 2 MG/ML
4 INJECTION, SOLUTION INTRAVENOUS EVERY 8 HOURS PRN
Status: DISCONTINUED | OUTPATIENT
Start: 2025-04-02 | End: 2025-04-03 | Stop reason: HOSPADM

## 2025-04-02 RX ORDER — PREGABALIN 75 MG/1
75 CAPSULE ORAL ONCE
Status: COMPLETED | OUTPATIENT
Start: 2025-04-02 | End: 2025-04-02

## 2025-04-02 RX ORDER — KETOROLAC TROMETHAMINE 15 MG/ML
15 INJECTION, SOLUTION INTRAMUSCULAR; INTRAVENOUS EVERY 6 HOURS
Status: COMPLETED | OUTPATIENT
Start: 2025-04-02 | End: 2025-04-03

## 2025-04-02 RX ORDER — CEFAZOLIN SODIUM 2 G/100ML
2 INJECTION, SOLUTION INTRAVENOUS ONCE
Status: COMPLETED | OUTPATIENT
Start: 2025-04-02 | End: 2025-04-02

## 2025-04-02 RX ORDER — ALBUTEROL SULFATE 0.83 MG/ML
2.5 SOLUTION RESPIRATORY (INHALATION) ONCE AS NEEDED
Status: DISCONTINUED | OUTPATIENT
Start: 2025-04-02 | End: 2025-04-02 | Stop reason: HOSPADM

## 2025-04-02 RX ORDER — ASPIRIN 81 MG/1
81 TABLET ORAL 2 TIMES DAILY
Status: DISCONTINUED | OUTPATIENT
Start: 2025-04-03 | End: 2025-04-03 | Stop reason: HOSPADM

## 2025-04-02 RX ORDER — POLYETHYLENE GLYCOL 3350 17 G/17G
17 POWDER, FOR SOLUTION ORAL DAILY
Status: DISCONTINUED | OUTPATIENT
Start: 2025-04-03 | End: 2025-04-03 | Stop reason: HOSPADM

## 2025-04-02 RX ORDER — DROPERIDOL 2.5 MG/ML
0.62 INJECTION, SOLUTION INTRAMUSCULAR; INTRAVENOUS ONCE AS NEEDED
Status: DISCONTINUED | OUTPATIENT
Start: 2025-04-02 | End: 2025-04-02 | Stop reason: HOSPADM

## 2025-04-02 RX ORDER — ACETAMINOPHEN 325 MG/1
650 TABLET ORAL EVERY 4 HOURS PRN
Status: DISCONTINUED | OUTPATIENT
Start: 2025-04-02 | End: 2025-04-02 | Stop reason: HOSPADM

## 2025-04-02 RX ORDER — ACETAMINOPHEN 500 MG
1000 TABLET ORAL EVERY 6 HOURS PRN
Qty: 240 TABLET | Refills: 0 | Status: SHIPPED | OUTPATIENT
Start: 2025-04-02 | End: 2025-05-02

## 2025-04-02 RX ORDER — NALOXONE HYDROCHLORIDE 0.4 MG/ML
0.2 INJECTION, SOLUTION INTRAMUSCULAR; INTRAVENOUS; SUBCUTANEOUS EVERY 5 MIN PRN
Status: DISCONTINUED | OUTPATIENT
Start: 2025-04-02 | End: 2025-04-03 | Stop reason: HOSPADM

## 2025-04-02 RX ORDER — SODIUM CHLORIDE, SODIUM LACTATE, POTASSIUM CHLORIDE, CALCIUM CHLORIDE 600; 310; 30; 20 MG/100ML; MG/100ML; MG/100ML; MG/100ML
50 INJECTION, SOLUTION INTRAVENOUS CONTINUOUS
Status: ACTIVE | OUTPATIENT
Start: 2025-04-02 | End: 2025-04-03

## 2025-04-02 RX ORDER — KETOROLAC TROMETHAMINE 30 MG/ML
INJECTION, SOLUTION INTRAMUSCULAR; INTRAVENOUS AS NEEDED
Status: DISCONTINUED | OUTPATIENT
Start: 2025-04-02 | End: 2025-04-02

## 2025-04-02 RX ORDER — PANTOPRAZOLE SODIUM 40 MG/1
40 TABLET, DELAYED RELEASE ORAL DAILY
Qty: 30 TABLET | Refills: 0 | Status: SHIPPED | OUTPATIENT
Start: 2025-04-02 | End: 2025-05-02

## 2025-04-02 RX ORDER — DOCUSATE SODIUM 100 MG/1
100 CAPSULE, LIQUID FILLED ORAL 2 TIMES DAILY
Qty: 60 CAPSULE | Refills: 0 | Status: SHIPPED | OUTPATIENT
Start: 2025-04-02 | End: 2025-05-02

## 2025-04-02 RX ORDER — OXYCODONE HYDROCHLORIDE 5 MG/1
5 TABLET ORAL EVERY 4 HOURS PRN
Status: DISCONTINUED | OUTPATIENT
Start: 2025-04-02 | End: 2025-04-02 | Stop reason: HOSPADM

## 2025-04-02 RX ORDER — OXYCODONE HCL 10 MG/1
10 TABLET, FILM COATED, EXTENDED RELEASE ORAL ONCE
Status: COMPLETED | OUTPATIENT
Start: 2025-04-02 | End: 2025-04-02

## 2025-04-02 RX ORDER — TRANEXAMIC ACID 100 MG/ML
INJECTION, SOLUTION INTRAVENOUS AS NEEDED
Status: DISCONTINUED | OUTPATIENT
Start: 2025-04-02 | End: 2025-04-02

## 2025-04-02 RX ORDER — LIDOCAINE HYDROCHLORIDE 10 MG/ML
INJECTION, SOLUTION INFILTRATION; PERINEURAL AS NEEDED
Status: DISCONTINUED | OUTPATIENT
Start: 2025-04-02 | End: 2025-04-02

## 2025-04-02 RX ORDER — ONDANSETRON HYDROCHLORIDE 2 MG/ML
INJECTION, SOLUTION INTRAVENOUS AS NEEDED
Status: DISCONTINUED | OUTPATIENT
Start: 2025-04-02 | End: 2025-04-02

## 2025-04-02 RX ORDER — FENTANYL CITRATE 50 UG/ML
50 INJECTION, SOLUTION INTRAMUSCULAR; INTRAVENOUS ONCE
Status: COMPLETED | OUTPATIENT
Start: 2025-04-02 | End: 2025-04-02

## 2025-04-02 RX ORDER — METOCLOPRAMIDE 10 MG/1
10 TABLET ORAL EVERY 6 HOURS PRN
Status: DISCONTINUED | OUTPATIENT
Start: 2025-04-02 | End: 2025-04-03 | Stop reason: HOSPADM

## 2025-04-02 RX ORDER — CEFAZOLIN SODIUM 2 G/100ML
2 INJECTION, SOLUTION INTRAVENOUS EVERY 8 HOURS
Status: COMPLETED | OUTPATIENT
Start: 2025-04-02 | End: 2025-04-02

## 2025-04-02 RX ORDER — CYCLOBENZAPRINE HCL 10 MG
5 TABLET ORAL 3 TIMES DAILY PRN
Status: DISCONTINUED | OUTPATIENT
Start: 2025-04-02 | End: 2025-04-03 | Stop reason: HOSPADM

## 2025-04-02 RX ORDER — OXYCODONE HYDROCHLORIDE 5 MG/1
5 TABLET ORAL EVERY 6 HOURS PRN
Qty: 28 TABLET | Refills: 0 | Status: SHIPPED | OUTPATIENT
Start: 2025-04-02 | End: 2025-04-09

## 2025-04-02 RX ORDER — DOCUSATE SODIUM 100 MG/1
100 CAPSULE, LIQUID FILLED ORAL 2 TIMES DAILY
Status: DISCONTINUED | OUTPATIENT
Start: 2025-04-02 | End: 2025-04-03 | Stop reason: HOSPADM

## 2025-04-02 RX ORDER — FENTANYL CITRATE 50 UG/ML
INJECTION, SOLUTION INTRAMUSCULAR; INTRAVENOUS AS NEEDED
Status: DISCONTINUED | OUTPATIENT
Start: 2025-04-02 | End: 2025-04-02

## 2025-04-02 RX ORDER — MIDAZOLAM HYDROCHLORIDE 1 MG/ML
2 INJECTION, SOLUTION INTRAMUSCULAR; INTRAVENOUS ONCE
Status: COMPLETED | OUTPATIENT
Start: 2025-04-02 | End: 2025-04-02

## 2025-04-02 RX ADMIN — HYDROMORPHONE HYDROCHLORIDE 0.5 MG: 1 INJECTION, SOLUTION INTRAMUSCULAR; INTRAVENOUS; SUBCUTANEOUS at 09:26

## 2025-04-02 RX ADMIN — FENTANYL CITRATE 25 MCG: 50 INJECTION INTRAMUSCULAR; INTRAVENOUS at 08:44

## 2025-04-02 RX ADMIN — PROPOFOL 20 MG: 10 INJECTION, EMULSION INTRAVENOUS at 08:43

## 2025-04-02 RX ADMIN — FENTANYL CITRATE 50 MCG: 0.05 INJECTION, SOLUTION INTRAMUSCULAR; INTRAVENOUS at 06:40

## 2025-04-02 RX ADMIN — TRANEXAMIC ACID 1000 MG: 100 INJECTION, SOLUTION INTRAVENOUS at 08:14

## 2025-04-02 RX ADMIN — SODIUM CHLORIDE, POTASSIUM CHLORIDE, SODIUM LACTATE AND CALCIUM CHLORIDE 50 ML/HR: 600; 310; 30; 20 INJECTION, SOLUTION INTRAVENOUS at 11:17

## 2025-04-02 RX ADMIN — MEPIVACAINE HYDROCHLORIDE 3 ML: 15 INJECTION, SOLUTION EPIDURAL; INFILTRATION at 07:09

## 2025-04-02 RX ADMIN — SCOPOLAMINE 1 PATCH: 1.5 PATCH, EXTENDED RELEASE TRANSDERMAL at 06:12

## 2025-04-02 RX ADMIN — HYDROMORPHONE HYDROCHLORIDE 0.5 MG: 1 INJECTION, SOLUTION INTRAMUSCULAR; INTRAVENOUS; SUBCUTANEOUS at 09:11

## 2025-04-02 RX ADMIN — TRANEXAMIC ACID 1000 MG: 100 INJECTION, SOLUTION INTRAVENOUS at 07:18

## 2025-04-02 RX ADMIN — DEXAMETHASONE SODIUM PHOSPHATE 4 MG: 4 INJECTION, SOLUTION INTRAMUSCULAR; INTRAVENOUS at 07:34

## 2025-04-02 RX ADMIN — DOCUSATE SODIUM 100 MG: 100 CAPSULE, LIQUID FILLED ORAL at 20:44

## 2025-04-02 RX ADMIN — Medication 2 L/MIN: at 10:58

## 2025-04-02 RX ADMIN — PREGABALIN 75 MG: 75 CAPSULE ORAL at 06:12

## 2025-04-02 RX ADMIN — PROPOFOL 20 MG: 10 INJECTION, EMULSION INTRAVENOUS at 08:35

## 2025-04-02 RX ADMIN — POVIDONE-IODINE 1 APPLICATION: 5 SOLUTION TOPICAL at 06:00

## 2025-04-02 RX ADMIN — ACETAMINOPHEN 650 MG: 325 TABLET ORAL at 18:11

## 2025-04-02 RX ADMIN — CEFAZOLIN SODIUM 2 G: 2 INJECTION, SOLUTION INTRAVENOUS at 15:03

## 2025-04-02 RX ADMIN — HYDROMORPHONE HYDROCHLORIDE 0.5 MG: 1 INJECTION, SOLUTION INTRAMUSCULAR; INTRAVENOUS; SUBCUTANEOUS at 09:03

## 2025-04-02 RX ADMIN — CEFAZOLIN SODIUM 2 G: 2 INJECTION, SOLUTION INTRAVENOUS at 23:22

## 2025-04-02 RX ADMIN — OXYCODONE HYDROCHLORIDE 10 MG: 10 TABLET, FILM COATED, EXTENDED RELEASE ORAL at 06:40

## 2025-04-02 RX ADMIN — FENTANYL CITRATE 25 MCG: 50 INJECTION INTRAMUSCULAR; INTRAVENOUS at 08:35

## 2025-04-02 RX ADMIN — SODIUM CHLORIDE, POTASSIUM CHLORIDE, SODIUM LACTATE AND CALCIUM CHLORIDE: 600; 310; 30; 20 INJECTION, SOLUTION INTRAVENOUS at 06:45

## 2025-04-02 RX ADMIN — MIDAZOLAM 2 MG: 1 INJECTION INTRAMUSCULAR; INTRAVENOUS at 06:40

## 2025-04-02 RX ADMIN — LIDOCAINE HYDROCHLORIDE 50 MG: 10 INJECTION, SOLUTION INFILTRATION; PERINEURAL at 07:13

## 2025-04-02 RX ADMIN — KETOROLAC TROMETHAMINE 15 MG: 15 INJECTION, SOLUTION INTRAMUSCULAR; INTRAVENOUS at 20:43

## 2025-04-02 RX ADMIN — PROPOFOL 70 MG: 10 INJECTION, EMULSION INTRAVENOUS at 07:14

## 2025-04-02 RX ADMIN — KETOROLAC TROMETHAMINE 15 MG: 15 INJECTION, SOLUTION INTRAMUSCULAR; INTRAVENOUS at 15:03

## 2025-04-02 RX ADMIN — ONDANSETRON 4 MG: 2 INJECTION, SOLUTION INTRAMUSCULAR; INTRAVENOUS at 08:14

## 2025-04-02 RX ADMIN — OXYCODONE HYDROCHLORIDE 5 MG: 5 TABLET ORAL at 22:08

## 2025-04-02 RX ADMIN — ACETAMINOPHEN 975 MG: 325 TABLET ORAL at 06:12

## 2025-04-02 RX ADMIN — ACETAMINOPHEN 650 MG: 325 TABLET ORAL at 23:22

## 2025-04-02 RX ADMIN — KETOROLAC TROMETHAMINE 30 MG: 30 INJECTION, SOLUTION INTRAMUSCULAR; INTRAVENOUS at 08:27

## 2025-04-02 RX ADMIN — METHOCARBAMOL 1000 MG: 100 INJECTION, SOLUTION INTRAMUSCULAR; INTRAVENOUS at 09:04

## 2025-04-02 RX ADMIN — ACETAMINOPHEN 650 MG: 325 TABLET ORAL at 12:45

## 2025-04-02 RX ADMIN — FENTANYL CITRATE 50 MCG: 50 INJECTION INTRAMUSCULAR; INTRAVENOUS at 07:13

## 2025-04-02 RX ADMIN — PROPOFOL 100 MCG/KG/MIN: 10 INJECTION, EMULSION INTRAVENOUS at 07:13

## 2025-04-02 RX ADMIN — MELOXICAM 7.5 MG: 7.5 TABLET ORAL at 06:12

## 2025-04-02 RX ADMIN — CEFAZOLIN SODIUM 2 G: 2 INJECTION, SOLUTION INTRAVENOUS at 07:11

## 2025-04-02 SDOH — ECONOMIC STABILITY: HOUSING INSECURITY: AT ANY TIME IN THE PAST 12 MONTHS, WERE YOU HOMELESS OR LIVING IN A SHELTER (INCLUDING NOW)?: NO

## 2025-04-02 SDOH — SOCIAL STABILITY: SOCIAL INSECURITY
WITHIN THE LAST YEAR, HAVE YOU BEEN RAPED OR FORCED TO HAVE ANY KIND OF SEXUAL ACTIVITY BY YOUR PARTNER OR EX-PARTNER?: NO

## 2025-04-02 SDOH — ECONOMIC STABILITY: FOOD INSECURITY: WITHIN THE PAST 12 MONTHS, THE FOOD YOU BOUGHT JUST DIDN'T LAST AND YOU DIDN'T HAVE MONEY TO GET MORE.: NEVER TRUE

## 2025-04-02 SDOH — SOCIAL STABILITY: SOCIAL INSECURITY: WITHIN THE LAST YEAR, HAVE YOU BEEN HUMILIATED OR EMOTIONALLY ABUSED IN OTHER WAYS BY YOUR PARTNER OR EX-PARTNER?: NO

## 2025-04-02 SDOH — ECONOMIC STABILITY: FOOD INSECURITY: HOW HARD IS IT FOR YOU TO PAY FOR THE VERY BASICS LIKE FOOD, HOUSING, MEDICAL CARE, AND HEATING?: NOT VERY HARD

## 2025-04-02 SDOH — SOCIAL STABILITY: SOCIAL INSECURITY: ABUSE: ADULT

## 2025-04-02 SDOH — ECONOMIC STABILITY: TRANSPORTATION INSECURITY: IN THE PAST 12 MONTHS, HAS LACK OF TRANSPORTATION KEPT YOU FROM MEDICAL APPOINTMENTS OR FROM GETTING MEDICATIONS?: NO

## 2025-04-02 SDOH — ECONOMIC STABILITY: INCOME INSECURITY: IN THE PAST 12 MONTHS HAS THE ELECTRIC, GAS, OIL, OR WATER COMPANY THREATENED TO SHUT OFF SERVICES IN YOUR HOME?: NO

## 2025-04-02 SDOH — HEALTH STABILITY: MENTAL HEALTH: CURRENT SMOKER: 0

## 2025-04-02 SDOH — ECONOMIC STABILITY: HOUSING INSECURITY: IN THE LAST 12 MONTHS, WAS THERE A TIME WHEN YOU WERE NOT ABLE TO PAY THE MORTGAGE OR RENT ON TIME?: NO

## 2025-04-02 SDOH — ECONOMIC STABILITY: HOUSING INSECURITY: IN THE PAST 12 MONTHS, HOW MANY TIMES HAVE YOU MOVED WHERE YOU WERE LIVING?: 0

## 2025-04-02 SDOH — SOCIAL STABILITY: SOCIAL INSECURITY: DO YOU FEEL ANYONE HAS EXPLOITED OR TAKEN ADVANTAGE OF YOU FINANCIALLY OR OF YOUR PERSONAL PROPERTY?: NO

## 2025-04-02 SDOH — SOCIAL STABILITY: SOCIAL INSECURITY: HAS ANYONE EVER THREATENED TO HURT YOUR FAMILY OR YOUR PETS?: NO

## 2025-04-02 SDOH — ECONOMIC STABILITY: FOOD INSECURITY: WITHIN THE PAST 12 MONTHS, YOU WORRIED THAT YOUR FOOD WOULD RUN OUT BEFORE YOU GOT THE MONEY TO BUY MORE.: NEVER TRUE

## 2025-04-02 SDOH — SOCIAL STABILITY: SOCIAL INSECURITY: ARE THERE ANY APPARENT SIGNS OF INJURIES/BEHAVIORS THAT COULD BE RELATED TO ABUSE/NEGLECT?: NO

## 2025-04-02 SDOH — SOCIAL STABILITY: SOCIAL INSECURITY: ARE YOU OR HAVE YOU BEEN THREATENED OR ABUSED PHYSICALLY, EMOTIONALLY, OR SEXUALLY BY ANYONE?: NO

## 2025-04-02 SDOH — SOCIAL STABILITY: SOCIAL INSECURITY: WITHIN THE LAST YEAR, HAVE YOU BEEN AFRAID OF YOUR PARTNER OR EX-PARTNER?: NO

## 2025-04-02 SDOH — SOCIAL STABILITY: SOCIAL INSECURITY: HAVE YOU HAD THOUGHTS OF HARMING ANYONE ELSE?: NO

## 2025-04-02 SDOH — SOCIAL STABILITY: SOCIAL INSECURITY: DOES ANYONE TRY TO KEEP YOU FROM HAVING/CONTACTING OTHER FRIENDS OR DOING THINGS OUTSIDE YOUR HOME?: NO

## 2025-04-02 SDOH — SOCIAL STABILITY: SOCIAL INSECURITY
WITHIN THE LAST YEAR, HAVE YOU BEEN KICKED, HIT, SLAPPED, OR OTHERWISE PHYSICALLY HURT BY YOUR PARTNER OR EX-PARTNER?: NO

## 2025-04-02 SDOH — SOCIAL STABILITY: SOCIAL INSECURITY: WERE YOU ABLE TO COMPLETE ALL THE BEHAVIORAL HEALTH SCREENINGS?: YES

## 2025-04-02 SDOH — SOCIAL STABILITY: SOCIAL INSECURITY: DO YOU FEEL UNSAFE GOING BACK TO THE PLACE WHERE YOU ARE LIVING?: NO

## 2025-04-02 ASSESSMENT — COGNITIVE AND FUNCTIONAL STATUS - GENERAL
HELP NEEDED FOR BATHING: A LITTLE
MOVING TO AND FROM BED TO CHAIR: A LITTLE
STANDING UP FROM CHAIR USING ARMS: A LITTLE
STANDING UP FROM CHAIR USING ARMS: A LITTLE
TOILETING: A LITTLE
WALKING IN HOSPITAL ROOM: A LITTLE
MOBILITY SCORE: 19
DAILY ACTIVITIY SCORE: 21
CLIMB 3 TO 5 STEPS WITH RAILING: A LOT
CLIMB 3 TO 5 STEPS WITH RAILING: A LITTLE
TURNING FROM BACK TO SIDE WHILE IN FLAT BAD: A LITTLE
DRESSING REGULAR LOWER BODY CLOTHING: A LITTLE
CLIMB 3 TO 5 STEPS WITH RAILING: A LITTLE
MOVING TO AND FROM BED TO CHAIR: A LITTLE
MOVING TO AND FROM BED TO CHAIR: A LITTLE
MOBILITY SCORE: 18
WALKING IN HOSPITAL ROOM: A LOT
DAILY ACTIVITIY SCORE: 21
MOBILITY SCORE: 19
TOILETING: A LITTLE
TURNING FROM BACK TO SIDE WHILE IN FLAT BAD: A LITTLE
STANDING UP FROM CHAIR USING ARMS: A LITTLE
DRESSING REGULAR LOWER BODY CLOTHING: A LITTLE
WALKING IN HOSPITAL ROOM: A LITTLE
HELP NEEDED FOR BATHING: A LITTLE
PATIENT BASELINE BEDBOUND: NO

## 2025-04-02 ASSESSMENT — PAIN - FUNCTIONAL ASSESSMENT
PAIN_FUNCTIONAL_ASSESSMENT: 0-10
PAIN_FUNCTIONAL_ASSESSMENT: 0-10
PAIN_FUNCTIONAL_ASSESSMENT: UNABLE TO SELF-REPORT
PAIN_FUNCTIONAL_ASSESSMENT: 0-10

## 2025-04-02 ASSESSMENT — ACTIVITIES OF DAILY LIVING (ADL)
JUDGMENT_ADEQUATE_SAFELY_COMPLETE_DAILY_ACTIVITIES: YES
ADLS_ADDRESSED: YES
ADL_ASSISTANCE: INDEPENDENT
HEARING - LEFT EAR: FUNCTIONAL
ASSISTIVE_DEVICE: WALKER
PATIENT'S MEMORY ADEQUATE TO SAFELY COMPLETE DAILY ACTIVITIES?: YES
ADEQUATE_TO_COMPLETE_ADL: YES
LACK_OF_TRANSPORTATION: NO
FEEDING YOURSELF: INDEPENDENT
GROOMING: INDEPENDENT
BATHING: INDEPENDENT
TOILETING: INDEPENDENT
DRESSING YOURSELF: INDEPENDENT
LACK_OF_TRANSPORTATION: NO
WALKS IN HOME: INDEPENDENT
HEARING - RIGHT EAR: FUNCTIONAL
LACK_OF_TRANSPORTATION: NO

## 2025-04-02 ASSESSMENT — PAIN DESCRIPTION - DESCRIPTORS
DESCRIPTORS: SHARP;STABBING
DESCRIPTORS: ACHING
DESCRIPTORS: THROBBING
DESCRIPTORS: SORE;THROBBING
DESCRIPTORS: ACHING

## 2025-04-02 ASSESSMENT — PAIN DESCRIPTION - LOCATION
LOCATION: KNEE
LOCATION: KNEE

## 2025-04-02 ASSESSMENT — PAIN SCALES - GENERAL
PAINLEVEL_OUTOF10: 7
PAINLEVEL_OUTOF10: 1
PAINLEVEL_OUTOF10: 3
PAINLEVEL_OUTOF10: 2
PAINLEVEL_OUTOF10: 0 - NO PAIN
PAINLEVEL_OUTOF10: 9
PAINLEVEL_OUTOF10: 2
PAIN_LEVEL: 5
PAINLEVEL_OUTOF10: 7
PAINLEVEL_OUTOF10: 1
PAINLEVEL_OUTOF10: 5 - MODERATE PAIN
PAINLEVEL_OUTOF10: 4
PAINLEVEL_OUTOF10: 2
PAINLEVEL_OUTOF10: 9
PAINLEVEL_OUTOF10: 8
PAINLEVEL_OUTOF10: 4

## 2025-04-02 ASSESSMENT — PAIN DESCRIPTION - ORIENTATION: ORIENTATION: RIGHT

## 2025-04-02 ASSESSMENT — LIFESTYLE VARIABLES
SKIP TO QUESTIONS 9-10: 0
HOW OFTEN DO YOU HAVE A DRINK CONTAINING ALCOHOL: MONTHLY OR LESS
AUDIT-C TOTAL SCORE: 2
AUDIT-C TOTAL SCORE: 2
HOW MANY STANDARD DRINKS CONTAINING ALCOHOL DO YOU HAVE ON A TYPICAL DAY: 3 OR 4
HOW OFTEN DO YOU HAVE 6 OR MORE DRINKS ON ONE OCCASION: NEVER

## 2025-04-02 ASSESSMENT — COLUMBIA-SUICIDE SEVERITY RATING SCALE - C-SSRS
2. HAVE YOU ACTUALLY HAD ANY THOUGHTS OF KILLING YOURSELF?: NO
1. IN THE PAST MONTH, HAVE YOU WISHED YOU WERE DEAD OR WISHED YOU COULD GO TO SLEEP AND NOT WAKE UP?: NO
6. HAVE YOU EVER DONE ANYTHING, STARTED TO DO ANYTHING, OR PREPARED TO DO ANYTHING TO END YOUR LIFE?: NO

## 2025-04-02 ASSESSMENT — PATIENT HEALTH QUESTIONNAIRE - PHQ9
SUM OF ALL RESPONSES TO PHQ9 QUESTIONS 1 & 2: 0
1. LITTLE INTEREST OR PLEASURE IN DOING THINGS: NOT AT ALL
2. FEELING DOWN, DEPRESSED OR HOPELESS: NOT AT ALL

## 2025-04-02 NOTE — DISCHARGE INSTRUCTIONS
MD Nely Belle MPAS, SHAD, ATC  Adult Reconstruction and Joint Replacement Surgery  Phone: [phone]     Fax: [phone]        DISCHARGE INSTRUCTIONS      PLEASE READ CAREFULLY BEFORE CONTACTING YOUR PROVIDER.    WE WORK COLLABORATIVELY AS A TEAM. CALLING MULTIPLE STAFF MEMBERS REGARDING THE SAME ISSUE WILL DELAY YOUR CARE.    [employer] IS THE PREFERRED COMMUNICATION FOR ALL TEAM MEMBERS.    POSTOPERATIVE INSTRUCTIONS: TOTAL HIP & TOTAL KNEE ARTHROPLASTY    JOINT CARE TEAM  Please use the information below to contact your care team following surgery.  If you are leaving a message or using the [facility] Chart portal, please include your full name, date of birth and date of surgery so that we can correctly identify you.  Your call will be returned within 1-2 business days, please do not leave multiple messages with other staff regarding a single issue while you are awaiting a return call.     Who to call Contact Information Matters needing handled   Nely Hutton PA-C  Physician Assistant Placed Portal   Medical questions/concerns       Samson Rowe-    [email or web address]           [phone]  opt. [address]    [phone] fax  [phone]         Scheduling office Visits  Medical questions/concerns  Leave of Absence or other paperwork  Any concerns more than 6 weeks from surgery - an appointment will need to be made    After Hour and Weekend Emergency Answering Service [phone]     Ivory Valencia MBA, BSN, RN-BC, ONC  GIOVANNI Ozuna, RN  Ortho Nurse Navigators [place]        __________________________________    Kyle Bowers RN, BSN  Ortho Coordinator [facility] HARPN-BC  Ortho Nurse Navigator [facility]       [phone]     _____________________    [phone] Please call staff at the institution in which you had surgery for prescription refills        Prescription Refills  Nursing, medical question related  questions or concerns within 6 weeks of surgery   Orders for Outpatient Physical Therapy             MEDICATION REFILLS - MyChart or Nurse Navigator (Above) at the institution in which you had surgery. [facility] or [facility].    -You will NOT receive a call indicating that your prescription has been filled.  Please contact your pharmacy with any questions.    Medication refills will be filled Monday-Friday 7am to 1pm ONLY. Please call the nurse navigator office or send a [employer] message for a refill request.  Any requests received outside of this timeframe will be handled on the next business day.  Please do not call multiple times or call other members of the care team for medication needs, this will cause the refill to take longer.    Per State and Institutional policy, pain medications can only be refilled every 7 days for up to six weeks following surgery.    My Chart Portal: If you are using the My Chart portal and are requesting a medication refill, please list what type of surgery you had and left or right side, medication that needs refilled, and pharmacy you would like your medication sent.     WEIGHT BEARING- weight bearing as tolerated to operative extremity     ACTIVITY-As Tolerated    DRIVING & TRAVEL AFTER SURGERY   Patients should anticipate waiting at least 4-6 weeks before traveling long distances after surgery.  You will need to stop to walk around ever 1 hour during your travel to help with blood clot prevention.    Patients may not drive until cleared by the joint nurse or the office and you are off of all narcotics.    DENTAL PROCEDURES & CLEANINGS  You must wait a minimum of 3 months for elective dental appointments after a total joint replacement, including routine cleanings or dental work including bridges, crowns, extractions, etc.. Unless, it is an emergency. You will need a prophylactic antibiotic lifelong prior to any dental visit, cleaning or procedure. Your surgeon's office or your  dentist may provide a prescription antibiotic. Antibiotics are a lifelong need before dental appointments.      You do not need antibiotics for endoscopic procedures such as colonoscopy or EGD, dermatologic biopsies or eye surgeries.    WOUND CARE  If you experience continued drainage or bleeding, you may cover with abdominal/Maxi pads (purchase at local drug store).  Knee replacements should wrap with an ace wrap.  You may shower with waterproof dressing on. Your surgical bandage will be removed by your home therapist 1 week after surgery. If you have staples intact, home care will remove in 2 weeks. If you have sutures intact, you will need to return to the office in 2 weeks for suture removal. Once the dressing is removed by home care, you may continue to shower. Let soap and water wash over the wound. DO NOT SCRUB.  Steri-strips under the bandage will remain in place until they fall off on their own.  If they are loose, you may gently remove.  If they have not fallen off in two weeks, gently peel them off. Do not remove if pulling causes resistance against the incision.  You will see suture tails sticking out of the ends of the incision.  DO NOT CUT THEM.  They will fall off when the sutures dissolve.  If they are bothersome, cover with a band aid.  Do not soak in a bath tub, hot tub, pool or lake until you are 8 weeks out from surgery.  Do not apply lotions, creams or ointments until you are 6 weeks out from surgery,    PAIN, SWELLING, BRUISING & CLICKING  Pain and swelling are a natural part of your recovery which is considered normal for up to a year after surgery.  Symptoms may be treated with movement, ice, compression stockings, elevating your leg, and by following the pain medication regimen as prescribed.  Bruising is normal for several weeks after surgery. You may also have leg swelling and pain in your shin.  You may ice areas that are tender to help with discomfort.  You are required to wear the  provided compression stockings, every day, for 4 weeks following surgery.  Remove the stockings at night and place them back on in the morning.  Pain and swelling may temporarily increase with an increase in activity or exercise.  Use ice after activity.  Audible clicking with movement or exercises is considered normal following joint replacement.  If this persists at 6 months or 1 year, please notify your surgeon.  You may also feel decreased sensation or numbness near the incision site.  This is normal and sensation may or may not return.    PERSONAL HYGIENE  You may shower upon discharging from the hospital.  Soap and water is permitted to run over the surgical dressing, steri-strips and incision.  Do not scrub directly over these items.  DO NOT soak your incision in a bath, hot tub, pool or pond/lake for a minimum of 8 weeks following your surgery.  DO NOT use lotions, creams, ointments on your wound for a minimum of 6 weeks following your surgery. At that time you may use vitamin E to assist with softening of your incision.      RESTARTING HOME ROUTINE - DIET & MEDICATIONS  Post-operative constipation can result due to a combination of inactivity, anesthesia and pain medication. To help prevent this, you should increase your water and fiber intake. Physical activity such as walking will also help stimulate the bowels.   You may resume your normal diet when you discharge home.    To avoid constipation, choose foods that help promote good bowel habits, such as foods high in fiber.  You may restart your home medications the following day after your surgery UNLESS you have been given alternate instructions.  Follow the instructions given to you on your hospital discharge instructions for more information regarding your home medications.  IF YOU EXPERIENCE NAUSEA OR DIARRHEA, FOLLOW THE B.R.A.T. DIET UNTIL SYMPTOMS RESOLVE.  If you are experiencing vomiting that lasts more than 24 hours, please contact your joint  nurse.      IN-HOME PHYSICAL THERAPY & OUTPATIENT PHYSICAL THERAPY  In-home physical therapy will start 1-2 days after you get home from the hospital.    The home care agency will call within the first 24-48 hours to set up their first visit.  Please do not call your care team to inquire during this timeframe.  Continue the exercises you were given in the hospital until you have been seen by in-home therapy.  Make sure to provide a phone number with the ability for the home care staff to leave a message if you do not answer your phone.    Outpatient physical therapy following knee replacement surgery should begin 2-3 weeks after surgery.  You will be given physical therapy order prior to discharge from the hospital. You should call to schedule this appointment ASAP if not already scheduled before surgery.  Waiting until you are ready for outpatient physical therapy will cause a delay in your care.  You may choose any outpatient physical therapy location.      EMERGENCIES - WHEN TO CONTACT THE SURGEON'S OFFICE IMMEDIATELY  Fever >101 with chills that has been present for at least 48 hours.   Excessive bleeding from incision that will not slow down. A small amount of drainage is normal and expected.  Once pressure is applied and the area is covered, do not continue to check the area regularly.  This will remove pressure and bleeding will continue.  Leave in place for 4-6 hours.  Signs of infection of incision-excessive drainage that is soaking through your dressing (especially if it is pus-like), redness that is spreading out from the edges of your incision, or increased warmth around the area.  Excruciating pain for which the pain medication, taken as instructed, is not helping.  Severe calf pain.  Go directly to the emergency room or call 911, if you are experiencing chest pain or difficulty breathing.    After Hour and Weekend Emergency Answering Service [phone]    ICE & COLD THERAPY INSTRUCTIONS    To assist  with pain control and post-op swelling, you should be using ice regularly throughout recovery, especially for the first 6 weeks, regardless of the cold therapy method you use.      Always make sure there is a layer of protection between the cold pad and your skin.    If you are using ICE PACKS or GEL PACKS, you will need to alternate 20 minutes on, 20 minutes off twice per hour.    If you are using an ICE MACHINE, please follow the provided ice machine instructions.  These devices differ from ice or ice packs whereas the mechanism circulates water through tubing and a pad to provide longer periods of cold therapy to the desired site.  You can use your cold devices around the clock for optimal comfort.  We recommend using cold therapy after working with therapy or completing exercises on your own.  There is no set schedule in which you must follow while using cold therapy.  Below are a few points to remember when using a cold therapy device:    You do not need to need to use the 20 on, 20 off method.  Detach the pad from the cooler and ambulate at least once every hour.  You can check your skin under the pad at this time.  You may wear the cold therapy device during periods of sleep including overnight.  If you wake up during the night, you can check the skin at this time.  You do not need to wake up specifically to perform skin checks.  Empty the cooler and pad when device is not in use.  Follow 's instructions for cleaning your cold therapy device.    DISCHARGE MEDICATIONS - Please reference the sample schedule on the reverse side for instructions on how to best schedule medications.    PAIN MEDICATION    ___X_ Tramadol / Oxycodone  Tramadol and Oxycodone have been prescribed for post-operative pain control.    These medications will only be refilled ONCE every 7 days for a period of up to 6 weeks following surgery.  After 6 weeks, you will transition to acetaminophen and over -the- counter  anti-inflammatories such as Ibuprofen, Advil or Aleve in conjunction with ICE/COLD THERAPY.   Side effects may be constipation and nausea, vomiting, sleepiness, dizziness, lightheadedness, headache, blurred vision, dry mouth sweating, itching (if you have itching, over-the -counter Benadryl can be used as needed).  You may NOT operate a motor vehicle while taking these medications or have been cleared by your care team.     ___X_ Acetaminophen (Tylenol)  Acetaminophen has been prescribed as an adjunct for pain control. Take two 500 mg tablets every 6 hours for 4 weeks. You will not receive a refill on this medication.  Do not exceed 4000mg of acetaminophen within a 24 hour period.  Side effects may include nausea, heartburn, drowsiness, and headache.    ___X_ Meloxicam (Mobic)-Meloxicam has been prescribed as an adjunct anti-inflammatory to assist in pain control.    Take one 15mg tablet once daily for 4 weeks.  You will not receive refills on this medication.   Side effects may include nausea.  May not be prescribed if you are on a more potent blood thinner than aspirin or have chronic kidney disease.    BLOOD THINNER    ___X_ Blood Thinner   A blood thinner has been prescribed to prevent blood clots in your leg or lungs. Take as prescribed on the bottle for 4 weeks. You will not receive a refill on this medication.    ANTI NAUSEA    ___X_ Proton Pump Inhibitor (PPI)-Stomach Acid Reduction Medication  If you are already on a PPI, you will continue your regular medication. If you are not, you will be prescribed Pantoprazole to help with nausea and protect your stomach while taking pain medication.  You will not receive a refill on this medication.    STOOL SOFTENERS    ___X_ Colace (Docusate Sodium) & Miralax (polyethylene glycol)  Take both medications to help with constipation while using the Oxycodone and Tramadol for pain control.  You will not receive a refill on this medication.    Continued Constipation  If  you continue to be constipated despite daily use of Miralax and Colace, you try an over-the-counter Dulcolax Suppository and use per instructions on the package.       SPECIAL INSTRUCTIONS   ***    You will not receive refills on the following medications.   Acetaminophen (Tylenol  Meloxicam  Miralax  Colace  Proton Pump Inhibitor (PPI)  Blood Thinner    Pain Medication Refills - Ortho Nurse Navigator or MyChart- Monday through Friday 7am-1pm    FOLLOW-UP- You should have an appointment with either Dr. Lisa or ERIK Garcia in 6 weeks.         SAMPLE              The times below are an example of how to organize medications to optimize pain control  Your actual medication schedule may vary based on your last dose taken IN THE HOSPITAL      Time 3:00 am 6:00 am 9:00 am 12:00 pm 3:00 pm 6:00 pm 9:00 pm 12:00 am   Medications Tramadol Tylenol  Oxycodone  Miralax   Blood Thinner  Colace  Pantoprazole or other PPI  Tramadol  Meloxicam Tylenol  Oxycodone Tramadol Tylenol  Oxycodone  Miralax Blood Thinner  Colace  Tramadol   Tylenol  Oxycodone            You may begin to wean off the pain medication as your pain remains controlled with increased activity.  The schedules provided are meant to serve as an example.  You may wean off based on your pain control.  Please note that pain medications are not filled beyond 6 weeks after surgery.              The times below are an example of how to WEAN OFF medications WHILE CONTINUING TO OPTIMIZE PAIN CONTROL.  Your actual medication schedule may vary based on your last dose taken.    Time 12:00am 4:00am 8:00am 12:00pm 4:00pm 8:00pm   Med Tramadol Oxycodone   Tramadol Oxycodone Tramadol Oxycodone     Time 12:00am 6:00am 12:00pm 6:00pm   Med Tramadol Oxycodone   Tramadol Oxycodone     Time 12:00am 8:00am 4:00pm   Med Tramadol Oxycodone   Tramadol     Time 12:00am 12:00pm   Med Tramadol Tramadol         TOTAL KNEE REPLACEMENT PATIENTS SHOULD TRANSITION TO OUTPATIENT PHYSICAL  THERAPY NO MORE THAN 3 WEEKS FOLLOWING SURGERY.  PLEASE SEE THE LIST OF  FACILITIES BELOW.  CALL TO SCHEDULE YOUR FIRST APPOINTMENT BEFORE YOU HAVE YOUR SURGERY.

## 2025-04-02 NOTE — DISCHARGE SUMMARY
MD Nely Belle, MPAS, PAVincentC, ATC  Adult Reconstruction and Joint Replacement Surgery  Phone: [phone]     Fax:[phone]             Discharge Summary    Discharge Diagnosis  Right Total Knee Arthroplasty    Issues Requiring Follow-Up  Home care services to start within 48 hours. Outpatient PT to start 2 weeks  S/P total Joint for Knees only. Hips optional.    Test Results Pending At Discharge  Pending Labs       No current pending labs.          Hospital Course  Patient underwent Right Total Knee Arthroplasty on 4/2/25 without complications. The patient was then taken to the PACU in stable condition. Patient was then transferred to the or.  Pain was appropriately controlled. Diet was advanced as tolerated. Patient progressed adequately through their recovery during hospital stay including PT/ OT and were recommended for discharge. Patient was then discharged on  to home in stable condition. Patient had uneventful hospital course. Patient was instructed on the use of pain medications as needed for pain. The signs and symptoms of infection were discussed and the patient was given our number to call should they have any questions or concerns following discharge.    Based on my clinical judgment, the patient was provided with a 7-day prescription for opioid medication at 30 MED, indicated for treatment of acute pain in the setting of recent Total Joint Arthroplasty. OARRS report was run and has demonstrated an appropriate time course.  The patient has been provided with counseling pertaining to safe use of opioid medication.    Patient may use operative extremity WBAT with use of walker for assistance with ambulation .  Mepilex dressing to be removed POD # 7 by home care and incision left open to air  OAC for DVT prophylaxis started on POD #1 and to be taken for 30 days    Patient is to follow-up in 6 weeks at scheduled post-op visit.     Face-to Face after surgery progress  note  Pertinent Physical Exam At Time of Discharge  Review of Systems   Constitutional: Negative.  Negative for activity change, chills, fatigue and fever.   HENT: Negative.     Eyes: Negative.    Respiratory: Negative.  Negative for cough, chest tightness, shortness of breath and wheezing.    Cardiovascular: Negative.  Negative for palpitations.   Gastrointestinal:  Negative for abdominal pain, blood in stool, nausea and vomiting.   Endocrine: Negative.  Negative for cold intolerance and polyuria.   Genitourinary: Negative.  Negative for difficulty urinating, dysuria, frequency, hematuria and urgency.   Musculoskeletal:  Positive for gait problem and joint swelling. Negative for arthralgias and back pain.   Skin: Negative.  Negative for color change, pallor, rash and wound.   Allergic/Immunologic: Negative.  Negative for environmental allergies.   Neurological:  Negative for dizziness, weakness and light-headedness.   Hematological: Negative.    Psychiatric/Behavioral:  Negative for agitation, confusion and suicidal ideas. The patient is not nervous/anxious.    All other systems reviewed and are negative    Physical Exam  side: right knee  Vitals and nursing note reviewed. VSS, Afebrile  Constitutional:       Appearance: Normal appearance, awake and alert.  HENT:      Head: Normocephalic and atraumatic.       Pupils: Pupils are equal, round, and reactive to light.   Cardiovascular:      Rate and Rhythm: Normal rate and regular rhythm.   Pulmonary:      Effort: Pulmonary effort is normal.     Abdominal:         Palpations: Abdomen is soft.   Musculoskeletal:   Sensation intact bilaterally, sural/saph/sp/tibal n.  Motor intact flexion/extension/DF/PF/EHL/FHL bilaterally. Palpable symmetric DP/PT pulse bilaterally. Spinal wearing off.    Skin:      Bulky Dressing intact to the surgical extremity. No signs of gross bloody or purulent drainage.     General: Skin is warm and dry.      Capillary Refill: Capillary refill  takes less than 2 seconds.   Neurological:      General: No focal deficit present.      Mental Status: She is alert and oriented to person, place, and time. Mental status is at baseline.   Psychiatric:         Mood and Affect: Mood normal.        Home Medications  Scheduled medications    Current Facility-Administered Medications:     ceFAZolin (Ancef) 2 g in dextrose (iso)  mL, 2 g, intravenous, Once, Nely Hutton PA-C    fentaNYL PF (Sublimaze) injection 50 mcg, 50 mcg, intravenous, Once, Shaheed Messer MD    midazolam (Versed) injection 2 mg, 2 mg, intravenous, Once, Shaheed Messer MD    oxyCODONE ER (OxyCONTIN) 12 hr tablet 10 mg, 10 mg, oral, Once, Nely Hutton PA-C    scopolamine (Transderm-Scop) patch 1 patch, 1 patch, transdermal, q72h, Nely Hutton PA-C, 1 patch at 04/02/25 0612     PRN medications      Discharge medications     Your medication list        START taking these medications        Instructions Last Dose Given Next Dose Due   acetaminophen 500 mg tablet  Commonly known as: Tylenol Extra Strength      Take 2 tablets (1,000 mg) by mouth every 6 hours if needed for mild pain (1 - 3).       aspirin 81 mg EC tablet      Take 1 tablet (81 mg) by mouth 2 times a day.       docusate sodium 100 mg capsule  Commonly known as: Colace      Take 1 capsule (100 mg) by mouth 2 times a day.       meloxicam 15 mg tablet  Commonly known as: Mobic      Take 1 tablet (15 mg) by mouth once daily.       oxyCODONE 5 mg immediate release tablet  Commonly known as: Roxicodone      Take 1 tablet (5 mg) by mouth every 6 hours if needed for severe pain (7 - 10) for up to 7 days.       pantoprazole 40 mg EC tablet  Commonly known as: ProtoNix      Take 1 tablet (40 mg) by mouth once daily. Do not crush, chew, or split.       polyethylene glycol 17 gram packet  Commonly known as: Glycolax, Miralax      Take 17 g by mouth once daily. Mix 1 cap (17g) into 8 ounces of fluid.       traMADol 50 mg  tablet  Commonly known as: Ultram      Take 1 tablet (50 mg) by mouth every 6 hours if needed for severe pain (7 - 10) for up to 7 days.              CONTINUE taking these medications        Instructions Last Dose Given Next Dose Due   ascorbic acid 250 mg tablet  Commonly known as: Vitamin C           chlorhexidine 0.12 % solution  Commonly known as: Peridex      Swish and spit 15 mL night before surgery and morning of surgery       omeprazole 20 mg DR capsule  Commonly known as: PriLOSEC           Vitamin D3 25 mcg (1000 units) tablet  Generic drug: cholecalciferol           ZINC ACETATE ORAL                  STOP taking these medications      atorvastatin 20 mg tablet  Commonly known as: Lipitor        ibuprofen 800 mg tablet                  Where to Get Your Medications        These medications were sent to [facility]  [address] , [address], [facility] [address]      Hours: 8 AM to 6 PM Mon-Fri, 9 AM to 1 PM Saturday Phone: [phone]   acetaminophen 500 mg tablet  aspirin 81 mg EC tablet  docusate sodium 100 mg capsule  meloxicam 15 mg tablet  oxyCODONE 5 mg immediate release tablet  pantoprazole 40 mg EC tablet  polyethylene glycol 17 gram packet  traMADol 50 mg tablet         You have not been prescribed any medications.     Outpatient Follow-Up  Patient to follow-up with /Nely Hutton PA-C.  Thank you for trusting us with your care. You should be scheduled for a follow-up post-surgical visit in 6 weeks.    Special Instructions      Please read discharge instructions provided by your surgeon before calling with questions as this will delay care.    Medication refills-Oxycodone and Tramadol will be refilled every 7 days per state law. Request refills through Joint Navigator at the institution in which you had surgery or MyChart. All medication requests may take up to 72 hours to refill and refills after Friday 1pm will be refilled on the next business day.      No future  appointments.    KADI Dave, PA-C ATC  Orthopedic Physician Assisant  Adult Reconstruction and Total Joint Replacement  General Orthopedics  Department of Orthopaedic Surgery  [place] [address]  First Aid Shot Therapyaging preferred

## 2025-04-02 NOTE — PROGRESS NOTES
KADI Dave, PAVincentC, ATC  Orthopedic Physician Assisant  Adult Reconstruction and Total Joint Replacement  General Orthopedics  Department of Orthopaedic Surgery  [place] [address]      RANDALL RickettsC

## 2025-04-02 NOTE — ANESTHESIA PROCEDURE NOTES
Peripheral Block    Patient location during procedure: pre-op  Start time: 4/2/2025 6:43 AM  End time: 4/2/2025 6:46 AM  Reason for block: at surgeon's request and post-op pain management  Staffing  Performed: attending   Authorized by: Donte Hudson DO    Performed by: Shaheed Messer MD  Preanesthetic Checklist  Completed: patient identified, IV checked, site marked, risks and benefits discussed, surgical consent, monitors and equipment checked, pre-op evaluation and timeout performed   Timeout performed at: 4/2/2025 6:38 AM  Peripheral Block  Patient position: laying flat  Prep: ChloraPrep  Patient monitoring: heart rate, cardiac monitor and continuous pulse ox  Block type: adductor canal  Laterality: right  Injection technique: single-shot  Guidance: ultrasound guided  Needle  Needle gauge: 21 G  Needle length: 10 cm  Needle localization: ultrasound guidance  Test dose: negative  Assessment  Injection assessment: negative aspiration for heme, no paresthesia on injection, incremental injection and local visualized surrounding nerve on ultrasound  Paresthesia pain: none  Heart rate change: no  Slow fractionated injection: yes  Additional Notes  Ropivicaine 20 ml 0.5 % with 5 mg Decadron

## 2025-04-02 NOTE — PROGRESS NOTES
Physical Therapy Evaluation & Treatment    Patient Name: Tye Rowley  MRN: [id number]  Department: [facility]  Room: [facility]  Today's Date: 4/2/2025   Time Calculation  Start Time: 1522  Stop Time: 1602  Time Calculation (min): 40 min    Assessment/Plan   PT Assessment  PT Assessment Results: Decreased strength, Decreased range of motion, Impaired balance, Decreased mobility, Decreased coordination, Impaired sensation, Impaired tone, Orthopedic restrictions, Pain  Rehab Prognosis: Excellent (anticipate quick progression towards rehab goals once sensation deficits improve)  Evaluation/Treatment Tolerance: Patient tolerated treatment well  Medical Staff Made Aware: Yes  Strengths: Access to adaptive/assistive products, Ability to acquire knowledge, Attitude of self, Capable of completing ADLs semi/independent, Insight into problems, Premorbid level of function, Rehab experience, Support of Caregivers  End of Session Communication: Bedside nurse, Physician (Dr. Lisa messaged via Secure chat regarding crutch vs walker use - OK for pt to use crutches for mobility upon DC if cleared by PT during AM session)    Assessment Comment: Pt mod complexity eval with evolving status presenting with heavy R knee buckle, impaired RLE sensation, impaired coordination, and deficits to functional mobility following R TKA performed on 4/2/2025. Education regarding TKA precautions provided with handout issued; pt verbalized understanding. Therapeutic exercises performed; HEP handout provided. Pt able to participate in bed mobility, functional transfers, and short ambulation bout with hands-on assist and safety cues. RN aware of pt presentation. PT will reassess functional status next session.    End of Session Patient Position: Up in chair, Alarm on with RLE elevated and extended with ice to surgical site. Hemovac drain intact to R knee draining to gravity. Call light left in reach; patient instructed not to get up on own and  verbalized understanding of this. RN aware.    IP OR SWING BED PT PLAN  Inpatient or Swing Bed: Inpatient  PT Plan  Treatment/Interventions: Bed mobility, Transfer training, Gait training, Stair training, Strengthening, Endurance training, Range of motion, Therapeutic exercise, Therapeutic activity, Home exercise program, Positioning, Neuromuscular re-education  PT Plan: Ongoing PT  PT Frequency: BID  PT Discharge Recommendations: Low intensity level of continued care  Equipment Recommended upon Discharge: Wheeled walker, Crutches  PT Recommended Transfer Status: Assist x2, Assistive device      Subjective     General Visit Information:  General  Reason for Referral: R TKA (4/2/2025)  Referred By: Dr. Lisa  Past Medical History Relevant to Rehab: OA, GERD, HLD, L TKA (2017)  Family/Caregiver Present: Yes (daughter)  Prior to Session Communication: Bedside nurse  Patient Position Received: Bed, 3 rail up, Alarm off, caregiver present  General Comment: Session cleared by RN. Pt pleasant and agreeable to PT evaluation. Dressing to R knee dry & intact. Hemovac drain intact to R knee draining to gravity.    Home Living:  Home Living  Type of Home: House  Lives With: Alone (daughter will be staying with pt for 1 week)  Home Adaptive Equipment: Crutches (pt adamant about using crutches upon DC)  Home Layout: Two level, Stairs to alternate level with rails  Alternate Level Stairs-Rails: Right  Alternate Level Stairs-Number of Steps: 3  Home Access: Stairs to enter without rails  Entrance Stairs-Number of Steps: 1 curb JERSON  Bathroom Shower/Tub: Walk-in shower  Bathroom Equipment: Grab bars in shower  Prior Level of Function:  Prior Function Per Pt/Caregiver Report  Level of [place]: Independent with ADLs and functional transfers, Independent with homemaking with ambulation  ADL Assistance: Independent  Homemaking Assistance: Independent  Ambulatory Assistance: Independent  Vocational: Full time employment  Prior  Function Comments: Pt denies falls prior to admission  Precautions:  Precautions  LE Weight Bearing Status: Weight Bearing as Tolerated  Medical Precautions: Fall precautions  Post-Surgical Precautions: Right total knee precautions     Date/Time Vitals Session Patient Position Pulse Resp SpO2 BP MAP (mmHg)    04/02/25 1445 --  --  80  16  97 %  121/78  92     04/02/25 1500 --  --  --  --  94 %  --  --                Objective   Pain:  Pain Assessment  Pain Assessment: 0-10  0-10 (Numeric) Pain Score: 1  Pain Type: Surgical pain  Pain Location: Knee  Pain Orientation: Right  Pain Interventions: Cold applied, Ambulation/increased activity, Elevated  Response to Interventions: Content/relaxed  Cognition:  Cognition  Overall Cognitive Status: Within Functional Limits  Attention: Within Functional Limits  Memory: Within Funtional Limits  Problem Solving: Within Functional Limits  Numeric Reasoning: Within Functional Limits  Abstract Reasoning: Within Functional Limits  Safety/Judgement: Within Functional Limits  Insight: Within function limits  Impulsive: Within functional limits  Processing Speed: Within funtional limits    General Assessments:  Activity Tolerance  Endurance: Tolerates 10 - 20 min exercise with multiple rests    Sensation  Light Touch: Partial deficits in the RLE (medial aspect of RLE impaired; lateral aspect intact. LLE intact)  Proprioception: No apparent deficits    Coordination  Movements are Fluid and Coordinated: No  Lower Body Coordination: impaired from post-op limitations    Postural Control  Postural Control: Within Functional Limits    Static Sitting Balance  Static Sitting-Balance Support: Feet supported  Static Sitting-Level of Assistance: Independent  Dynamic Sitting Balance  Dynamic Sitting-Balance Support: Bilateral upper extremity supported  Dynamic Sitting-Level of Assistance: Distant supervision    Static Standing Balance  Static Standing-Balance Support: Bilateral upper extremity  supported (with rolling walker)  Static Standing-Level of Assistance: Contact guard  Dynamic Standing Balance  Dynamic Standing-Balance Support: Bilateral upper extremity supported (with rolling wakler)  Dynamic Standing-Level of Assistance: Minimum assistance  Functional Assessments:  ADL  ADL's Addressed: Yes  Toileting Deficit: Use of bedpan/urinal setup (pt stood at walker ~2 minutes to void 700cc into urinal. RN notified)    Bed Mobility  Bed Mobility: Yes  Bed Mobility 1  Bed Mobility 1: Supine to sitting, Scooting  Level of Assistance 1: Independent    Transfers  Transfer: Yes  Transfer 1  Transfer From 1: Bed to, Stand to  Transfer to 1: Stand, Chair with arms  Technique 1: Sit to stand, Stand to sit  Transfer Device 1: Walker, Gait belt  Transfer Level of Assistance 1: Contact guard, Minimal verbal cues (cues for sequencing, hand placement, body mechanics)  Trials/Comments 1: x2    Ambulation/Gait Training  Ambulation/Gait Training Performed: Yes  Ambulation/Gait Training 1  Surface 1: Level tile  Device 1: Rolling walker  Gait Support Devices: Gait belt  Assistance 1: Minimum assistance, Minimal verbal cues (cues for sequencing, body mechanics)  Quality of Gait 1: Narrow base of support, Diminished heel strike, Decreased step length, Knee(s) buckle (heavy knee buckle without LOB. LLE hop to pattern utilized d/t heavy buckle)  Comments/Distance (ft) 1: 3 feet to bedside chair    Stairs  Stairs: No  Extremity/Trunk Assessments:  RUE   RUE : Within Functional Limits  LUE   LUE: Within Functional Limits  RLE   RLE : Exceptions to WFL  AROM RLE (degrees)  RLE AROM Comment: R knee AROM 5-90 degrees  Strength RLE  RLE Overall Strength: Greater than or equal to 3/5 as evidenced by functional mobility, Deficits (hamstrings WFL. unable to perform SLR or SAQ, heavy extensor lag)  LLE   LLE : Within Functional Limits  Treatments:  Therapeutic Exercise  Therapeutic Exercise Performed: Yes B ankle pumps, R quad sets, R  gluteal sets, R heel slides, R SAQ, R hip abduction, and R SLR x 10 reps each. Required active assist for SAQ and SLR.       Outcome Measures:  [facility] Basic Mobility  Turning from your back to your side while in a flat bed without using bedrails: None  Moving from lying on your back to sitting on the side of a flat bed without using bedrails: None  Moving to and from bed to chair (including a wheelchair): A little  Standing up from a chair using your arms (e.g. wheelchair or bedside chair): A little  To walk in hospital room: A lot  Climbing 3-5 steps with railing: A lot  Basic Mobility - Total Score: 18    Encounter Problems       Encounter Problems (Active)       Balance       LTG - Patient will maintain standing and sitting balance to allow for completion of daily activities (Progressing)       Start:  04/02/25               Compromised Skin Integrity       LTG - Patient will be free from infection (Progressing)       Start:  04/02/25               Mobility       LTG - Patient will ambulate community distance using rolling walker with close supervision.  (Progressing)       Start:  04/02/25            LTG - Patient will navigate 3 steps using rails/device with close supervision.  (Progressing)       Start:  04/02/25               PT Transfers       LTG - Patient will demonstrate safe transfer techniques using rolling walker with distant supervision.  (Progressing)       Start:  04/02/25                 Safety       LTG - Patient will demonstrate safety requirements appropriate to situation/environment (Progressing)       Start:  04/02/25                   Education Documentation  Handouts, taught by Marlen Park PT at 4/2/2025  4:36 PM.  Learner: Family, Patient  Readiness: Eager  Method: Explanation, Demonstration, Handout  Response: Verbalizes Understanding, Demonstrated Understanding    Precautions, taught by Marlen Park PT at 4/2/2025  4:36 PM.  Learner: Family, Patient  Readiness: Eager  Method:  Explanation, Demonstration, Handout  Response: Verbalizes Understanding, Demonstrated Understanding    Body Mechanics, taught by Marlen Park PT at 4/2/2025  4:36 PM.  Learner: Family, Patient  Readiness: Eager  Method: Explanation, Demonstration, Handout  Response: Verbalizes Understanding, Demonstrated Understanding    Home Exercise Program, taught by Marlen Park PT at 4/2/2025  4:36 PM.  Learner: Family, Patient  Readiness: Eager  Method: Explanation, Demonstration, Handout  Response: Verbalizes Understanding, Demonstrated Understanding    Mobility Training, taught by Marlen Park PT at 4/2/2025  4:36 PM.  Learner: Family, Patient  Readiness: Eager  Method: Explanation, Demonstration, Handout  Response: Verbalizes Understanding, Demonstrated Understanding    Education Comments  No comments found.          Marlen Park PT, DPT

## 2025-04-02 NOTE — PROGRESS NOTES
59 yr old male admitted extended recovery following right knee replacement with Dr. Lisa.  Plan is home tomorrow with [facility] PT. SOC confirmed on 4/4/25.  Post op follow up on Thursday May 15, 2025 1:40 PM (Arrive by 1:25 PM)-[facility]  Daughter to transport and planning to stay and assist with care for a week or so.   04/02/25 1233   Discharge Planning   Living Arrangements Alone   Support Systems Children;Family members   Assistance Needed transportation   Type of Residence Private residence   Number of Stairs to Enter Residence 1   Number of Stairs Within Residence 15   Do you have animals or pets at home? No   Who is requesting discharge planning? Provider   Home or Post Acute Services In home services   Type of Home Care Services Home PT   Expected Discharge Disposition Home H  ( Home Care)   Does the patient need discharge transport arranged? No   Financial Resource Strain   How hard is it for you to pay for the very basics like food, housing, medical care, and heating? Not hard   Housing Stability   In the last 12 months, was there a time when you were not able to pay the mortgage or rent on time? N   In the past 12 months, how many times have you moved where you were living? 0   At any time in the past 12 months, were you homeless or living in a shelter (including now)? N   Transportation Needs   In the past 12 months, has lack of transportation kept you from medical appointments or from getting medications? no   In the past 12 months, has lack of transportation kept you from meetings, work, or from getting things needed for daily living? No   Patient Choice   Provider Choice list and CMS website (https://medicare.gov/care-compare#search) for post-acute Quality and Resource Measure Data were provided and reviewed with: Patient   Patient / Family choosing to utilize agency / facility established prior to hospitalization No   Stroke Family Assessment   Stroke Family Assessment Needed No

## 2025-04-02 NOTE — ANESTHESIA PROCEDURE NOTES
Spinal Block    Patient location during procedure: OR  Start time: 4/2/2025 7:00 AM  End time: 4/2/2025 7:09 AM  Reason for block: primary anesthetic  Staffing  Performed: EMILY   Authorized by: Donte Hudson DO    Performed by: EMILY Díaz    Preanesthetic Checklist  Completed: patient identified, IV checked, risks and benefits discussed, surgical consent, pre-op evaluation, timeout performed and sterile techniques followed  Block Timeout  RN/Licensed healthcare professional reads aloud to the Anesthesia provider and entire team: Patient identity, procedure with side and site, patient position, and as applicable the availability of implants/special equipment/special requirements.  Patient on coagulant treatment: no  Timeout performed at: 4/2/2025 7:00 AM  Spinal Block  Patient position: sitting  Prep: Betadine  Sterility prep: gloves, drape and mask  Sedation level: light sedation  Patient monitoring: blood pressure and continuous pulse oximetry  Approach: midline  Vertebral space: L4-5  Injection technique: single-shot  Needle  Needle type: pencil-point   Needle gauge: 24 G  Needle length: 4 in  Free flowing CSF: yes    Assessment  Block outcome: patient comfortable  Procedure assessment: patient sedated but conversant throughout procedure and patient tolerated procedure well with no immediate complications

## 2025-04-02 NOTE — PERIOPERATIVE NURSING NOTE
Pt meets criteria for RNF. Travel ticket sent, RTM activated. Op note completed.  Report received by Yamilet FLORES RN.

## 2025-04-02 NOTE — ANESTHESIA POSTPROCEDURE EVALUATION
Patient: Tye Rowley    Procedure Summary       Date: 04/02/25 Room / Location: [facility] 02 / Virtual [facility]    Anesthesia Start: 0658 Anesthesia Stop: 0900    Procedure: Knee Replacement Total Cement Unilat (Right: Knee) Diagnosis:       Unilateral primary osteoarthritis, right knee      (Unilateral primary osteoarthritis, right knee [M17.11])    Surgeons: Bryant Lisa MD Responsible Provider: Donte Hudson DO    Anesthesia Type: spinal, regional ASA Status: 2            Anesthesia Type: spinal, regional    Vitals Value Taken Time   /80 04/02/25 0935   Temp 36.3 °C (97.3 °F) 04/02/25 0852   Pulse 80 04/02/25 0935   Resp 20 04/02/25 0935   SpO2 94 % 04/02/25 0925       Anesthesia Post Evaluation    Patient location during evaluation: PACU  Patient participation: complete - patient participated  Level of consciousness: awake  Pain score: 5  Pain management: adequate  Multimodal analgesia pain management approach  Airway patency: patent  Two or more strategies used to mitigate risk of obstructive sleep apnea  Cardiovascular status: acceptable  Respiratory status: face mask  Hydration status: acceptable  Postoperative Nausea and Vomiting: none        There were no known notable events for this encounter.

## 2025-04-02 NOTE — PERIOPERATIVE NURSING NOTE
"Sister ( employee) @ bedside.   Pt stated pain is gradually improving, \"just a constant throb\"  Attending anesthesia updated on POC and pr status.  "

## 2025-04-02 NOTE — CONSULTS
Consults    Reason For Consult  Postop medical management for history of GERD hyperlipidemia and arthritis additionally postop pain management    History Of Present Illness  Tye Rowley is a 59 y.o. male presenting with right total knee replacement; patient is hemodynamically stable upon arrival from PACU.     Past Medical History  He has a past medical history of Arthritis, GERD (gastroesophageal reflux disease), History of blood transfusion, and Hyperlipidemia.    He has no past medical history of Anemia, Asthma, Awareness under anesthesia, Cerebral vascular accident (Multi), CHF (congestive heart failure), Chronic kidney disease, COPD (chronic obstructive pulmonary disease) (Multi), Coronary artery disease, Delayed emergence from general anesthesia, Disease of thyroid gland, HL (hearing loss), Hypertension, Malignant hyperthermia, Myocardial infarction (Multi), PONV (postoperative nausea and vomiting), Pseudocholinesterase deficiency, Refusal of blood product, Seizure disorder (Multi), Sleep apnea, Stroke (Multi), TIA (transient ischemic attack), Type 2 diabetes mellitus, or Vision loss.    Surgical History  He has a past surgical history that includes Knee Arthroplasty (Left, 2017); Colonoscopy; and Upper gastrointestinal endoscopy.     Social History  He reports that he has quit smoking. His smoking use included cigarettes. He has never used smokeless tobacco. He reports current alcohol use of about 10.0 standard drinks of alcohol per week. He reports that he does not currently use drugs.    Family History  Family History   Problem Relation Name Age of Onset    Alcohol abuse Mother Hetal     Heart attack Father  60    Heart disease Maternal Grandfather Donte         Allergies  Patient has no known allergies.    Review of Systems  10 system review noncontributory  Physical Exam  Patient is alert in no acute distress  Lungs are clear to auscultation and percussion  Cardiac is regular rate and rhythm normal  S1-S2 without murmur gallop rub click S3 or S4  Abdomen is soft nontender positive bowel sounds there is no hepatosplenomegaly or CVA tenderness appreciated  Extremities without cyanosis clubbing erythema or edema  Operative site exam per Ortho extremities warm pulses are intact patient can dorsiflex and plantarflex  Last Recorded Vitals  /82 (BP Location: Left arm, Patient Position: Lying)   Pulse 87   Temp 36.7 °C (98.1 °F) (Temporal)   Resp 16   Wt 76.1 kg (167 lb 12.3 oz)   SpO2 95%     Relevant Results  Scheduled medications  acetaminophen, 650 mg, oral, q6h JUDY  [START ON 4/3/2025] aspirin, 81 mg, oral, BID  ceFAZolin, 2 g, intravenous, q8h  docusate sodium, 100 mg, oral, BID  ketorolac, 15 mg, intravenous, q6h  [START ON 4/3/2025] pantoprazole, 40 mg, oral, Daily before breakfast  [START ON 4/3/2025] polyethylene glycol, 17 g, oral, Daily      Continuous medications  lactated Ringer's, 50 mL/hr, Last Rate: 50 mL/hr (04/02/25 1117)  oxygen, 2 L/min      PRN medications  PRN medications: benzocaine-menthol, bisacodyl, bisacodyl, cyclobenzaprine, HYDROmorphone, metoclopramide **OR** metoclopramide, naloxone, ondansetron ODT **OR** ondansetron, oxyCODONE, oxyCODONE    No results found for this or any previous visit (from the past 24 hours).       Assessment/Plan   Status post right total knee replaced  -Management per Ortho  -PT  -Pain control  -Supportive care    Hyperlipidemia  -Continue current medications    GERD  -Continue current medications    DVT prophylaxis  -Aspirin protocol  -SCD  -Ambulate      Ginna Singh MD

## 2025-04-02 NOTE — CARE PLAN
The patient's goals for the shift include pain management    The clinical goals for the shift include pain management

## 2025-04-02 NOTE — NURSING NOTE
Admitted to room 205 from surgery.Right knee dressing intact with no noted drainage. Brisk capillary refill.2+ pedal pulses. Hemovac drain with small amount of bloody drainage.

## 2025-04-02 NOTE — OP NOTE
Knee Replacement Total Cement Unilat (R) Operative Note     Date: 2025  OR Location: [facility]    Name: Tye Rowley, : 1965, Age: 59 y.o., MRN: [id number], Sex: male    Diagnosis  Pre-op Diagnosis      * Unilateral primary osteoarthritis, right knee [M17.11] Post-op Diagnosis     * Unilateral primary osteoarthritis, right knee [M17.11]     Procedures  Knee Replacement Total Cement Unilat  03973 - AK ARTHRP KNE CONDYLE&PLATU MEDIAL&LAT COMPARTMENTS      Surgeons      * Bryant Lisa - Primary    Resident/Fellow/Other Assistant:  Surgeons and Role:  * No surgeons found with a matching role *    Staff:   Circulator: Sheree  Scrub Person: Yelena  Scrub Person: Arabella    Anesthesia Staff: Anesthesiologist: DO ADELE Meyers-AA: EMILY Díaz  WANDY: Mike Burgess    Procedure Summary  Anesthesia: Regional, Spinal  ASA: II  Estimated Blood Loss: 25mL  Intra-op Medications:   Administrations occurring from 0700 to 0930 on 25:   Medication Name Total Dose   ropivacaine-epinephrine-clonidine-ketorolac 2.46-0.005- 0.0008-0.3mg/mL periarticular syringe 50 mL   dexAMETHasone (Decadron) 4 mg/mL 4 mg   fentaNYL PF 0.05 mg/mL 50 mcg   lidocaine (Xylocaine) 1 % 50 mg   mepivacaine PF (Carbocaine) 1.5 % 3 mL   ondansetron 2 mg/mL 4 mg   propofol (Diprivan) injection 10 mg/mL 967.98 mg   tranexamic acid injection 100 mg/mL 2,000 mg   ceFAZolin (Ancef) 2 g in dextrose (iso)  mL 2 g              Anesthesia Record               Intraprocedure I/O Totals          Intake    Tranexamic Acid 0.00 mL    The total shown is the total volume documented since Anesthesia Start was filed.    ceFAZolin (Ancef) 2 g in dextrose (iso)  mL 100.00 mL    Total Intake 100 mL       Output    Est. Blood Loss 25 mL    Total Output 25 mL       Net    Net Volume 75 mL          Specimen: No specimens collected              Drains and/or Catheters:   Closed/Suction Drain Right Knee (Active)       Tourniquet Times:    * Missing tourniquet times found for documented tourniquets in lo *     Implants:  Implants       Type Name Action Serial No.      Joint Knee INSERT, ATTUNE PS FB, SZ 8, 7MM - [id number] Implanted      Joint Knee TIBAL BASE ATTUNE FB, SZ 7 SHABNAM - [id number] Implanted      Joint Knee FEMORAL, ATTUNE PS, SHABNAM, SZ 8, RT - [id number] Implanted      Joint Knee DOME, PATELLA, MEDIALIZED, 41MM - [id number] Implanted      Joint Knee BONE CEMENT, SMART SET, HIGH VISCOSITY, 40GM - [id number] Implanted      Joint Knee BONE CEMENT, SMART SET, HIGH VISCOSITY, 40GM - [id number] Implanted               Findings: R TKA    Indications: Tye Rowley is an 59 y.o. male who is having surgery for Unilateral primary osteoarthritis, right knee [M17.11].     The patient was seen in the preoperative area. The risks, benefits, complications, treatment options, non-operative alternatives, expected recovery and outcomes were discussed with the patient. The possibilities of reaction to medication, pulmonary aspiration, injury to surrounding structures, bleeding, recurrent infection, the need for additional procedures, failure to diagnose a condition, and creating a complication requiring transfusion or operation were discussed with the patient. The patient concurred with the proposed plan, giving informed consent.  The site of surgery was properly noted/marked if necessary per policy. The patient has been actively warmed in preoperative area. Preoperative antibiotics have been ordered and given within 1 hours of incision. Venous thrombosis prophylaxis have been ordered including bilateral sequential compression devices    Procedure Details: R TKA  Complications:  None; patient tolerated the procedure well.    Disposition: PACU - hemodynamically stable.  Condition: stable     PREOPERATIVE DIAGNOSIS:  right knee osteoarthritis     POSTOPERATIVE DIAGNOSIS:  right knee osteoarthritis     OPERATION/PROCEDURE:  right total knee  arthroplasty     SURGEON:  Bryant Lisa MD     ASSISTANT(S):  ALESSIA Garcia    The patient's surgery was assisted by ERIK Garcia, due to lack of availability of a qualified resident to assist with the surgery.  Nely Hutton was present for the essential parts of the procedure.  She acted as first assistant and assisted with preparing the leg, draping the leg, exposing the knee, retracting and manipulating the leg during surgery, facilitating safe performance of the procedure, and closure of the wound.     ANESTHESIA:  Spinal, adductor     LOCATION:  [facility]     ESTIMATED BLOOD LOSS AND INTRAVENOUS FLUIDS:  Please see Anesthesia record     COMPONENTS USED:  De[employer] knee system  1. 8 femur  2. 7 tibia  3. 41 patella  4. 7mm insert     BRIEF CLINICAL NOTE:  The patient is a 60 yo male with advanced osteoarthritis of  their right knee.  They failed conservative treatment and wished to  proceed with total knee arthroplasty which is indicated at this time.  We discussed the risks, benefits, and alternatives of surgery  including but not limited to infection, damage to vessel or nerve,  bleeding, soft tissue pain, DVT, PE, problems with anesthesia, lack  of range of motion, continued soft tissue pain, need for further  surgery, etc.  Consent was obtained.  They were taken to the operating  room in order to undergo the procedure.    PRE-OP ROM:      OPERATIVE REPORT:  The patient was transferred to the operating room table.  Time-out  was performed confirming patient name, medical record number,  surgical site, and adequate and appropriate imaging.  The patient  received appropriate IV antibiotics as well as tranexamic acid.  Once we were prepped and draped,midline skin incision was performed.  Hemostasis was obtained using electrocautery.  Underlying extensor mechanism was easily identified and entered using a standard medial parapatellar arthrotomy performedsharply.  The infrapatellar and  suprapatellar fat pads were debrided.Initial medial release was performed.  The patella was subluxed laterally.  Distal femur was entered using a step drill.  Distal  femoral cut was performed, and the femur was sized and prepared.  The tibia was subluxed forward using a double-prong PCL retractor.  The proximal tibia was cut in neutral mechanical axis using an  extramedullary tibial guide.  We then used the lamina  to clear out the posterior osteophytes and clear the meniscal remnants. We then sized the tibia and prepared it. We cut the patella freehand using a caliper to restore the native patellar height. We then trialed with multiple polyethylene inserts.  Once I was happy with the position of components and stability of the knee, all trial components were removed.  The  cut bony surfaces were thoroughly irrigated and dried.  The posterior capsule and surrounding soft tissues were injected with LASHAE solution we then cemented into place the real components.  The knee was held in extension until all cement was hardened.  All extraneous cement was  removed.  We then closed the extensor mechanism over a drain using interrupted #2 Ethibond as well as interrupted 0 Vicryl.  The subcu was closed with interrupted 2-0 Vicryl.  The skin was closed with  running 3-0 Biosyn followed by Dermabond and Steri-Strips.  Dry sterile dressing was placed.  The patient was transferred back to the hospital bed without incident or complication.  She will be  weightbearing as tolerated.  They will be on ASA and SCDs for DVT prophylaxis.     Task Performed by TERRIE First Assist or Physician Assistant:   Nely Hutton PA/NP, was necessary to assist on this case due to the nature of the case and difficulty. During the case Nely Hutton served as my assist by preparing the leg, draping the leg, exposing the knee, retracting and manipulating the leg during surgery, facilitating safe performance of the procedure, and closure of the  wound.    Additional Details: WBAT, ASA    Attending Attestation: I was present and scrubbed for the key portions of the procedure.

## 2025-04-02 NOTE — ANESTHESIA PREPROCEDURE EVALUATION
Patient: Tye Rowley    Procedure Information       Date/Time: 04/02/25 0700    Procedure: Knee Replacement Total Cement Unilat *Overnight* (Right: Knee) - DePuy Attune Knee, Pineapple Arthur    Location: [facility] / [facility]    Surgeons: Bryant Lisa MD            Relevant Problems   Anesthesia (within normal limits)      Cardiac (within normal limits)      Pulmonary (within normal limits)      Neuro (within normal limits)      GI (within normal limits)      /Renal (within normal limits)      Liver (within normal limits)      Endocrine (within normal limits)      Hematology (within normal limits)      Musculoskeletal   (+) Localized osteoarthritis of right knee   (+) Primary osteoarthritis of right knee   (+) Unilateral primary osteoarthritis, right knee      HEENT (within normal limits)      ID (within normal limits)      Skin (within normal limits)      GYN (within normal limits)       Clinical information reviewed:   Tobacco  Allergies  Meds   Med Hx  Surg Hx   Fam Hx  Soc Hx      Vitals:    04/02/25 0546   BP: (!) 134/91   Pulse: 80   Resp: 18   Temp: 36.4 °C (97.5 °F)       Past Surgical History:   Procedure Laterality Date   • COLONOSCOPY     • KNEE ARTHROPLASTY Left 2017   • UPPER GASTROINTESTINAL ENDOSCOPY       Past Medical History:   Diagnosis Date   • Arthritis    • GERD (gastroesophageal reflux disease)    • History of blood transfusion     at age 19   • Hyperlipidemia        Current Facility-Administered Medications:   •  ceFAZolin (Ancef) 2 g in dextrose (iso)  mL, 2 g, intravenous, Once, Nely Hutton PA-C  •  fentaNYL PF (Sublimaze) injection 50 mcg, 50 mcg, intravenous, Once, Shaheed Messer MD  •  midazolam (Versed) injection 2 mg, 2 mg, intravenous, Once, Shaheed Messer MD  •  oxyCODONE ER (OxyCONTIN) 12 hr tablet 10 mg, 10 mg, oral, Once, Nely Hutton PA-C  •  scopolamine (Transderm-Scop) patch 1 patch, 1 patch, transdermal, q72h, Nely Hutton PA-C, 1  patch at 04/02/25 0612  Prior to Admission medications    Medication Sig Start Date End Date Taking? Authorizing Provider   ascorbic acid (Vitamin C) 250 mg tablet Take 1 tablet (250 mg) by mouth once daily.   Yes Historical Provider, MD   chlorhexidine (Peridex) 0.12 % solution Swish and spit 15 mL night before surgery and morning of surgery 3/14/25  Yes Soraida Bucio PA-C   cholecalciferol (Vitamin D3) 25 mcg (1000 units) tablet Take 1 tablet (1,000 Units) by mouth once daily.   Yes Historical Provider, MD   ibuprofen 800 mg tablet Take 1 tablet (800 mg) by mouth every 8 hours if needed for mild pain (1 - 3).   Yes Historical Provider, MD   omeprazole (PriLOSEC) 20 mg DR capsule Take 1 capsule (20 mg) by mouth 2 times a day. 11/6/23  Yes Historical Provider, MD   ZINC ACETATE ORAL Use in the mouth or throat.   Yes Historical Provider, MD   acetaminophen (Tylenol Extra Strength) 500 mg tablet Take 2 tablets (1,000 mg) by mouth every 6 hours if needed for mild pain (1 - 3). 4/2/25 5/2/25  Nely Hutton PA-C   aspirin 81 mg EC tablet Take 1 tablet (81 mg) by mouth 2 times a day. 4/2/25 5/2/25  Nely Hutton PA-C   atorvastatin (Lipitor) 20 mg tablet     Historical Provider, MD   docusate sodium (Colace) 100 mg capsule Take 1 capsule (100 mg) by mouth 2 times a day. 4/2/25 5/2/25  Nely Hutton PA-C   meloxicam (Mobic) 15 mg tablet Take 1 tablet (15 mg) by mouth once daily. 4/2/25 5/2/25  Nely Hutton PA-C   oxyCODONE (Roxicodone) 5 mg immediate release tablet Take 1 tablet (5 mg) by mouth every 6 hours if needed for severe pain (7 - 10) for up to 7 days. 4/2/25 4/9/25  Nely Hutton PA-C   pantoprazole (ProtoNix) 40 mg EC tablet Take 1 tablet (40 mg) by mouth once daily. Do not crush, chew, or split. 4/2/25 5/2/25  Nely Hutton PA-C   polyethylene glycol (Glycolax, Miralax) 17 gram packet Take 17 g by mouth once daily. Mix 1 cap (17g) into 8 ounces of fluid. 4/2/25 5/2/25  Nely Hutton PA-C  "  traMADol (Ultram) 50 mg tablet Take 1 tablet (50 mg) by mouth every 6 hours if needed for severe pain (7 - 10) for up to 7 days. 4/2/25 4/9/25  Nely Hutton PA-C     No Known Allergies  Social History     Tobacco Use   • Smoking status: Former     Types: Cigarettes   • Smokeless tobacco: Never   • Tobacco comments:     Quit over 15 years ago   Substance Use Topics   • Alcohol use: Yes     Alcohol/week: 10.0 standard drinks of alcohol     Types: 10 Cans of beer per week         Chemistry    Lab Results   Component Value Date/Time     03/14/2025 1045    K 5.2 03/14/2025 1045     03/14/2025 1045    CO2 27 03/14/2025 1045    BUN 15 03/14/2025 1045    CREATININE 0.94 03/14/2025 1045    Lab Results   Component Value Date/Time    CALCIUM 10.0 03/14/2025 1045    ALKPHOS 83 03/14/2025 1045    AST 25 03/14/2025 1045    ALT 29 03/14/2025 1045    BILITOT 0.7 03/14/2025 1045          Lab Results   Component Value Date/Time    WBC 5.9 03/14/2025 1045    HGB 15.3 03/14/2025 1045    HCT 44.2 03/14/2025 1045     03/14/2025 1045     No results found for: \"PROTIME\", \"PTT\", \"INR\"  Encounter Date: 03/14/25   ECG 12 Lead   Result Value    Ventricular Rate 67    Atrial Rate 67    TN Interval 146    QRS Duration 84    QT Interval 372    QTC Calculation(Bazett) 393    P Axis 52    R Axis 34    T Axis 42    QRS Count 11    Q Onset 220    P Onset 147    P Offset 192    T Offset 406    QTC Fredericia 386    Narrative    Normal sinus rhythm  Normal ECG    Confirmed by Sam Robledo ([id number]) on 3/14/2025 5:45:45 PM       NPO Detail:  NPO/Void Status  Date of Last Liquid: 04/01/25  Time of Last Liquid: 2100  Date of Last Solid: 04/01/25  Time of Last Solid: 2100  Last Intake Type: Light meal  Time of Last Void: 0551         Physical Exam    Airway  Mallampati: II  TM distance: >3 FB  Neck ROM: full     Cardiovascular    Dental - normal exam     Pulmonary    Abdominal          Anesthesia Plan    History of general " anesthesia?: yes  History of complications of general anesthesia?: no    ASA 2     spinal and regional     The patient is not a current smoker.  Patient was not previously instructed to abstain from smoking on day of procedure.  Patient did not smoke on day of procedure.  Education provided regarding risk of obstructive sleep apnea.  intravenous induction   Anesthetic plan and risks discussed with patient.    Plan discussed with CRNA and CAA.

## 2025-04-02 NOTE — PERIOPERATIVE NURSING NOTE
Pt received from OR via cart, monitors on. Pt grimacing, restless, moaning. Pain 9/10, LINO.   Report received. Pt assessment completed. Orders reviewed/released. Attending anesthesia @ bedside to evaluate. Pt medicated per orders.

## 2025-04-02 NOTE — PERIOPERATIVE NURSING NOTE
Pt observed resting in short intervals for brief amount of time. Wakes, states still 8/10.   Post op pain control and expectations reviewed.

## 2025-04-02 NOTE — NURSING NOTE
Pt oriented to room  Call light is within reach  SCD's on  Encouraged lunch  Admission vitals obtained

## 2025-04-02 NOTE — CARE PLAN
Problem: Balance  Goal: LTG - Patient will maintain standing and sitting balance to allow for completion of daily activities  Outcome: Progressing     Problem: Mobility  Goal: LTG - Patient will ambulate community distance using rolling walker with close supervision.   Outcome: Progressing  Goal: LTG - Patient will navigate 3 steps using rails/device with close supervision.   Outcome: Progressing     Problem: Safety  Goal: LTG - Patient will demonstrate safety requirements appropriate to situation/environment  Outcome: Progressing     Problem: PT Transfers  Goal: LTG - Patient will demonstrate safe transfer techniques using rolling walker with distant supervision.   Outcome: Progressing     Problem: Pain  Goal: LTG - Patient will manage pain with the appropriate technique/Intervention  Outcome: Progressing     Problem: Compromised Skin Integrity  Goal: LTG - Patient will be free from infection  Outcome: Progressing

## 2025-04-02 NOTE — HH CARE COORDINATION
Home Care received a Referral for Physical Therapy. We have processed the referral for a Start of Care on 04/03.     If you have any questions or concerns, please feel free to contact us at [phone]. Follow the prompts, enter your five digit zip code, and you will be directed to your care team on [facility].

## 2025-04-03 ENCOUNTER — APPOINTMENT (OUTPATIENT)
Dept: CARDIOLOGY | Facility: HOSPITAL | Age: 60
End: 2025-04-03
Payer: COMMERCIAL

## 2025-04-03 VITALS
HEIGHT: 70 IN | WEIGHT: 167.77 LBS | TEMPERATURE: 96.8 F | DIASTOLIC BLOOD PRESSURE: 80 MMHG | OXYGEN SATURATION: 96 % | SYSTOLIC BLOOD PRESSURE: 117 MMHG | BODY MASS INDEX: 24.02 KG/M2 | HEART RATE: 79 BPM | RESPIRATION RATE: 18 BRPM

## 2025-04-03 PROBLEM — M17.11 UNILATERAL PRIMARY OSTEOARTHRITIS, RIGHT KNEE: Status: RESOLVED | Noted: 2025-02-02 | Resolved: 2025-04-03

## 2025-04-03 PROBLEM — M17.11 LOCALIZED OSTEOARTHRITIS OF RIGHT KNEE: Status: RESOLVED | Noted: 2025-04-02 | Resolved: 2025-04-03

## 2025-04-03 LAB
ANION GAP SERPL CALC-SCNC: 12 MMOL/L (ref 10–20)
BUN SERPL-MCNC: 21 MG/DL (ref 6–23)
CALCIUM SERPL-MCNC: 8.9 MG/DL (ref 8.6–10.3)
CHLORIDE SERPL-SCNC: 105 MMOL/L (ref 98–107)
CO2 SERPL-SCNC: 24 MMOL/L (ref 21–32)
CREAT SERPL-MCNC: 1.16 MG/DL (ref 0.5–1.3)
EGFRCR SERPLBLD CKD-EPI 2021: 73 ML/MIN/1.73M*2
ERYTHROCYTE [DISTWIDTH] IN BLOOD BY AUTOMATED COUNT: 12.4 % (ref 11.5–14.5)
GLUCOSE SERPL-MCNC: 128 MG/DL (ref 74–99)
HCT VFR BLD AUTO: 29.4 % (ref 41–52)
HGB BLD-MCNC: 10.5 G/DL (ref 13.5–17.5)
MCH RBC QN AUTO: 33 PG (ref 26–34)
MCHC RBC AUTO-ENTMCNC: 35.7 G/DL (ref 32–36)
MCV RBC AUTO: 93 FL (ref 80–100)
NRBC BLD-RTO: ABNORMAL /100{WBCS}
PLATELET # BLD AUTO: 217 X10*3/UL (ref 150–450)
POTASSIUM SERPL-SCNC: 3.8 MMOL/L (ref 3.5–5.3)
RBC # BLD AUTO: 3.18 X10*6/UL (ref 4.5–5.9)
SODIUM SERPL-SCNC: 137 MMOL/L (ref 136–145)
WBC # BLD AUTO: 10.4 X10*3/UL (ref 4.4–11.3)

## 2025-04-03 PROCEDURE — 97530 THERAPEUTIC ACTIVITIES: CPT | Mod: GP

## 2025-04-03 PROCEDURE — 80048 BASIC METABOLIC PNL TOTAL CA: CPT | Performed by: PHYSICIAN ASSISTANT

## 2025-04-03 PROCEDURE — 36415 COLL VENOUS BLD VENIPUNCTURE: CPT | Performed by: PHYSICIAN ASSISTANT

## 2025-04-03 PROCEDURE — 97116 GAIT TRAINING THERAPY: CPT | Mod: GP

## 2025-04-03 PROCEDURE — 2500000004 HC RX 250 GENERAL PHARMACY W/ HCPCS (ALT 636 FOR OP/ED): Performed by: PHYSICIAN ASSISTANT

## 2025-04-03 PROCEDURE — 85027 COMPLETE CBC AUTOMATED: CPT | Performed by: PHYSICIAN ASSISTANT

## 2025-04-03 PROCEDURE — 7100000011 HC EXTENDED STAY RECOVERY HOURLY - NURSING UNIT

## 2025-04-03 PROCEDURE — 93005 ELECTROCARDIOGRAM TRACING: CPT

## 2025-04-03 PROCEDURE — 2500000001 HC RX 250 WO HCPCS SELF ADMINISTERED DRUGS (ALT 637 FOR MEDICARE OP): Performed by: PHYSICIAN ASSISTANT

## 2025-04-03 PROCEDURE — 99221 1ST HOSP IP/OBS SF/LOW 40: CPT | Performed by: EMERGENCY MEDICINE

## 2025-04-03 PROCEDURE — 97110 THERAPEUTIC EXERCISES: CPT | Mod: GP

## 2025-04-03 RX ADMIN — OXYCODONE HYDROCHLORIDE 10 MG: 5 TABLET ORAL at 10:24

## 2025-04-03 RX ADMIN — KETOROLAC TROMETHAMINE 15 MG: 15 INJECTION, SOLUTION INTRAMUSCULAR; INTRAVENOUS at 09:39

## 2025-04-03 RX ADMIN — ACETAMINOPHEN 650 MG: 325 TABLET ORAL at 06:08

## 2025-04-03 RX ADMIN — POLYETHYLENE GLYCOL 3350 17 G: 17 POWDER, FOR SOLUTION ORAL at 09:39

## 2025-04-03 RX ADMIN — PANTOPRAZOLE SODIUM 40 MG: 40 TABLET, DELAYED RELEASE ORAL at 06:08

## 2025-04-03 RX ADMIN — KETOROLAC TROMETHAMINE 15 MG: 15 INJECTION, SOLUTION INTRAMUSCULAR; INTRAVENOUS at 02:13

## 2025-04-03 RX ADMIN — ASPIRIN 81 MG: 81 TABLET, COATED ORAL at 09:39

## 2025-04-03 RX ADMIN — DOCUSATE SODIUM 100 MG: 100 CAPSULE, LIQUID FILLED ORAL at 09:39

## 2025-04-03 ASSESSMENT — COGNITIVE AND FUNCTIONAL STATUS - GENERAL
WALKING IN HOSPITAL ROOM: A LITTLE
TOILETING: A LITTLE
MOVING TO AND FROM BED TO CHAIR: A LITTLE
MOBILITY SCORE: 19
STANDING UP FROM CHAIR USING ARMS: A LITTLE
DAILY ACTIVITIY SCORE: 21
DRESSING REGULAR LOWER BODY CLOTHING: A LITTLE
TURNING FROM BACK TO SIDE WHILE IN FLAT BAD: A LITTLE
HELP NEEDED FOR BATHING: A LITTLE
CLIMB 3 TO 5 STEPS WITH RAILING: A LITTLE

## 2025-04-03 ASSESSMENT — PAIN DESCRIPTION - DESCRIPTORS
DESCRIPTORS: ACHING
DESCRIPTORS: ACHING

## 2025-04-03 ASSESSMENT — PAIN - FUNCTIONAL ASSESSMENT
PAIN_FUNCTIONAL_ASSESSMENT: 0-10
PAIN_FUNCTIONAL_ASSESSMENT: UNABLE TO SELF-REPORT
PAIN_FUNCTIONAL_ASSESSMENT: 0-10

## 2025-04-03 ASSESSMENT — PAIN SCALES - GENERAL
PAINLEVEL_OUTOF10: 0 - NO PAIN
PAINLEVEL_OUTOF10: 0 - NO PAIN
PAINLEVEL_OUTOF10: 3
PAINLEVEL_OUTOF10: 7
PAINLEVEL_OUTOF10: 0 - NO PAIN
PAINLEVEL_OUTOF10: 0 - NO PAIN
PAINLEVEL_OUTOF10: 5 - MODERATE PAIN

## 2025-04-03 ASSESSMENT — PAIN DESCRIPTION - LOCATION: LOCATION: KNEE

## 2025-04-03 ASSESSMENT — PAIN DESCRIPTION - ORIENTATION: ORIENTATION: RIGHT

## 2025-04-03 NOTE — CARE PLAN
Pt POD#1 Right knee replacement.  Plan is home today with [facility] PT.  SOC confirmed on 4/4/25.  Daughter to transport and assist with care.

## 2025-04-03 NOTE — PROGRESS NOTES
Medication Education     Medication education for Tye Rowley was provided to the patient  for the following medication(s):  Aspirin  Oxycodone  Tramadol  Tylenol  Meloxicam  Protonix  Docusate  Miralax      Medication education provided by a Pharmacist:  Dose, frequency, storage Proper dose, indication, possible ADRs Refilling the medication  How the medication works and benefits of taking it Benefits of taking the medication  Importance of compliance    Identified potential barriers to education:  None    Method(s) of Education:  Verbal Written materials provided and reviewed    An opportunity to ask questions and receive answers was provided.     Assessment of understanding the patient :  2= meets goals/outcomes    Additional Notes (if applicable):     M2B delivered.    Leonard Go, PharmD

## 2025-04-03 NOTE — NURSING NOTE
Resting in bed with eyes closed and call light within reach.  Bed alarm is on.  Respirations even and unlabored on room air.

## 2025-04-03 NOTE — CONSULTS
Consults    Reason For Consult  Medical management for history of GERD hyperlipidemia rheumatoid arthritis and postop pain    History Of Present Illness  Tye Rowley is a 59 y.o. male presenting with right total knee replacement he has remained hemodynamically stable postoperatively and is medically stable for discharge to home.     Past Medical History  He has a past medical history of Arthritis, GERD (gastroesophageal reflux disease), History of blood transfusion, and Hyperlipidemia.    He has no past medical history of Anemia, Asthma, Awareness under anesthesia, Cerebral vascular accident (Multi), CHF (congestive heart failure), Chronic kidney disease, COPD (chronic obstructive pulmonary disease) (Multi), Coronary artery disease, Delayed emergence from general anesthesia, Disease of thyroid gland, HL (hearing loss), Hypertension, Malignant hyperthermia, Myocardial infarction (Multi), PONV (postoperative nausea and vomiting), Pseudocholinesterase deficiency, Refusal of blood product, Seizure disorder (Multi), Sleep apnea, Stroke (Multi), TIA (transient ischemic attack), Type 2 diabetes mellitus, or Vision loss.    Surgical History  He has a past surgical history that includes Knee Arthroplasty (Left, 2017); Colonoscopy; and Upper gastrointestinal endoscopy.     Social History  He reports that he has quit smoking. His smoking use included cigarettes. He has never used smokeless tobacco. He reports current alcohol use of about 10.0 standard drinks of alcohol per week. He reports that he does not currently use drugs.    Family History  Family History   Problem Relation Name Age of Onset    Alcohol abuse Mother Hetal     Heart attack Father  60    Heart disease Maternal Grandfather Donte         Allergies  Patient has no known allergies.    Review of Systems  10 system review is noncontributory  Physical Exam  Patient is alert in no acute distress  Lungs are clear to auscultation and percussion  Cardiac is  regular rate and rhythm normal S1-S2 without murmur gallop rub click S3 or S4  Abdomen is soft nontender positive bowel sounds there is no hepatosplenomegaly or CVA tenderness appreciated  .  Extremities without cyanosis clubbing erythema or edema  Operative site exam per Ortho; extremities warm pulses are intact patient can dorsiflex plantarflex  Last Recorded Vitals  /80 (BP Location: Right arm, Patient Position: Lying)   Pulse 79   Temp 36 °C (96.8 °F) (Temporal)   Resp 18   Wt 76.1 kg (167 lb 12.3 oz)   SpO2 96%     Relevant Results  Scheduled medications  acetaminophen, 650 mg, oral, q6h JUDY  aspirin, 81 mg, oral, BID  docusate sodium, 100 mg, oral, BID  pantoprazole, 40 mg, oral, Daily before breakfast  polyethylene glycol, 17 g, oral, Daily      Continuous medications  lactated Ringer's, 50 mL/hr, Last Rate: Stopped (04/03/25 0548)      PRN medications  PRN medications: benzocaine-menthol, bisacodyl, bisacodyl, cyclobenzaprine, HYDROmorphone, metoclopramide **OR** metoclopramide, naloxone, ondansetron ODT **OR** ondansetron, oxyCODONE, oxyCODONE, oxygen    Results for orders placed or performed during the hospital encounter of 04/02/25 (from the past 24 hours)   CBC   Result Value Ref Range    WBC 10.4 4.4 - 11.3 x10*3/uL    nRBC      RBC 3.18 (L) 4.50 - 5.90 x10*6/uL    Hemoglobin 10.5 (L) 13.5 - 17.5 g/dL    Hematocrit 29.4 (L) 41.0 - 52.0 %    MCV 93 80 - 100 fL    MCH 33.0 26.0 - 34.0 pg    MCHC 35.7 32.0 - 36.0 g/dL    RDW 12.4 11.5 - 14.5 %    Platelets 217 150 - 450 x10*3/uL   Basic metabolic panel   Result Value Ref Range    Glucose 128 (H) 74 - 99 mg/dL    Sodium 137 136 - 145 mmol/L    Potassium 3.8 3.5 - 5.3 mmol/L    Chloride 105 98 - 107 mmol/L    Bicarbonate 24 21 - 32 mmol/L    Anion Gap 12 10 - 20 mmol/L    Urea Nitrogen 21 6 - 23 mg/dL    Creatinine 1.16 0.50 - 1.30 mg/dL    eGFR 73 >60 mL/min/1.73m*2    Calcium 8.9 8.6 - 10.3 mg/dL         Assessment/Plan   Expand All Collapse  All    Consults     Reason For Consult  Postop medical management for history of GERD hyperlipidemia and arthritis additionally postop pain management     History Of Present Illness  Tye Rowley is a 59 y.o. male presenting with right total knee replacement; patient is hemodynamically stable upon arrival from PACU.     Past Medical History  He has a past medical history of Arthritis, GERD (gastroesophageal reflux disease), History of blood transfusion, and Hyperlipidemia.     He has no past medical history of Anemia, Asthma, Awareness under anesthesia, Cerebral vascular accident (Multi), CHF (congestive heart failure), Chronic kidney disease, COPD (chronic obstructive pulmonary disease) (Multi), Coronary artery disease, Delayed emergence from general anesthesia, Disease of thyroid gland, HL (hearing loss), Hypertension, Malignant hyperthermia, Myocardial infarction (Multi), PONV (postoperative nausea and vomiting), Pseudocholinesterase deficiency, Refusal of blood product, Seizure disorder (Multi), Sleep apnea, Stroke (Multi), TIA (transient ischemic attack), Type 2 diabetes mellitus, or Vision loss.     Surgical History  He has a past surgical history that includes Knee Arthroplasty (Left, 2017); Colonoscopy; and Upper gastrointestinal endoscopy.     Social History  He reports that he has quit smoking. His smoking use included cigarettes. He has never used smokeless tobacco. He reports current alcohol use of about 10.0 standard drinks of alcohol per week. He reports that he does not currently use drugs.     Family History  Family History          Family History   Problem Relation Name Age of Onset    Alcohol abuse Mother Hetal      Heart attack Father   60    Heart disease Maternal Grandfather Donte              Allergies  Patient has no known allergies.     Review of Systems  10 system review noncontributory  Physical Exam  Patient is alert in no acute distress  Lungs are clear to auscultation and  percussion  Cardiac is regular rate and rhythm normal S1-S2 without murmur gallop rub click S3 or S4  Abdomen is soft nontender positive bowel sounds there is no hepatosplenomegaly or CVA tenderness appreciated  Extremities without cyanosis clubbing erythema or edema  Operative site exam per Ortho extremities warm pulses are intact patient can dorsiflex and plantarflex  Last Recorded Vitals  /82 (BP Location: Left arm, Patient Position: Lying)   Pulse 87   Temp 36.7 °C (98.1 °F) (Temporal)   Resp 16   Wt 76.1 kg (167 lb 12.3 oz)   SpO2 95%      Relevant Results  Scheduled medications    Scheduled Medications   acetaminophen, 650 mg, oral, q6h JUDY  [START ON 4/3/2025] aspirin, 81 mg, oral, BID  ceFAZolin, 2 g, intravenous, q8h  docusate sodium, 100 mg, oral, BID  ketorolac, 15 mg, intravenous, q6h  [START ON 4/3/2025] pantoprazole, 40 mg, oral, Daily before breakfast  [START ON 4/3/2025] polyethylene glycol, 17 g, oral, Daily         Continuous medications    Continuous Medications   lactated Ringer's, 50 mL/hr, Last Rate: 50 mL/hr (04/02/25 1117)  oxygen, 2 L/min         PRN medications  PRN Medications   PRN medications: benzocaine-menthol, bisacodyl, bisacodyl, cyclobenzaprine, HYDROmorphone, metoclopramide **OR** metoclopramide, naloxone, ondansetron ODT **OR** ondansetron, oxyCODONE, oxyCODONE        No results found for this or any previous visit (from the past 24 hours).           Assessment/Plan  Status post right total knee replaced  -Management per Ortho  -PT  -Pain control  -Supportive care     Hyperlipidemia  -Continue current medications     GERD  -Continue current medications     DVT prophylaxis  -Aspirin protocol  -SCD  -Ambulate    Rheumatoid arthritis  -Follow-up with rheumatologist  -Restart Biologics as directed           Patient is medically stable for discharge to home    Ginna Singh MD

## 2025-04-03 NOTE — NURSING NOTE
1246pm Pt tolerated PT without difficulty, hemovac removed without difficulty. Pt given written and verbal discharge instructions with verbalized understanding. Pt's iv heplock removed without difficulty. Pt escorted down via w/c for dc home all personal belongings accompanying.

## 2025-04-03 NOTE — NURSING NOTE
Resting in bed with call light within reach and bed alarm on visiting with family.  Voices adequate pain control at this time.  Iceman cooler to right knee for comfort.  Mepilex AG dressing and ace wrap to right knee surgical incision are clean, dry, and intact.  Hemovac drain to right knee is draining sanguinous drainage.

## 2025-04-03 NOTE — NURSING NOTE
Pt. Sonia signs obtained.  Pt. Encouraged to order breakfast.  Call light within reach.  Bed alarm on.  Scd's on.

## 2025-04-03 NOTE — NURSING NOTE
0800am Assumed care of pt, A/Ox 4 denies any c/o pains no distress noted. Pt has right knee dressing dry and intact with  hemovac and ice man intact. Pt in bed encouraged to order breakfast has call light in reach.

## 2025-04-03 NOTE — PROGRESS NOTES
Interdisciplinary Rounds were completed at the bedside with Patient.  Staff participating in rounds included: Clinical Nurse Orthopedic Coordinator Transitional Care Coordinator Nursing Leadership.  Topics discussed included: Today's Plan of Care Discharge Plan and Accommodations Physical Therapy Medications/Preferred Pharmacy and the Patient was given the opportunity to ask additional questions or bring up any concerns at that time.  During the final discharge discussion and review of instructions, they will have another opportunity to review questions or concerns prior to leaving our care.  Patient was given information on who to call post-discharge should new questions or concerns arise.      At the time of this discussion, the patient's plan includes:    Discharge Date/Disposition:  Home, Today with, Home Care Services  Discharge Needs: No Equipment Needs Identified  Medications/Pharmacy: [id number] service utilized for discharge prescriptions through  [facility]

## 2025-04-04 ENCOUNTER — HOME CARE VISIT (OUTPATIENT)
Dept: HOME HEALTH SERVICES | Facility: HOME HEALTH | Age: 60
End: 2025-04-04
Payer: COMMERCIAL

## 2025-04-04 VITALS — SYSTOLIC BLOOD PRESSURE: 146 MMHG | HEART RATE: 94 BPM | TEMPERATURE: 98.7 F | DIASTOLIC BLOOD PRESSURE: 88 MMHG

## 2025-04-04 PROCEDURE — G0151 HHCP-SERV OF PT,EA 15 MIN: HCPCS

## 2025-04-04 SDOH — HEALTH STABILITY: PHYSICAL HEALTH: EXERCISE COMMENTS: SITTING LAQ AP HEEL SLIDES  SUPINR AP QS GS HEEL SLIDES SAQ X 10 REPS.

## 2025-04-04 ASSESSMENT — ENCOUNTER SYMPTOMS
PERSON REPORTING PAIN: PATIENT
OCCASIONAL FEELINGS OF UNSTEADINESS: 0
LOSS OF SENSATION IN FEET: 0
MUSCLE WEAKNESS: 1
LOWEST PAIN SEVERITY IN PAST 24 HOURS: 5/10
PAIN LOCATION: RIGHT KNEE
PAIN LOCATION - PAIN SEVERITY: 8/10
LIMITED RANGE OF MOTION: 1
PAIN: 1
DEPRESSION: 0
HIGHEST PAIN SEVERITY IN PAST 24 HOURS: 9/10

## 2025-04-04 ASSESSMENT — ACTIVITIES OF DAILY LIVING (ADL)
OASIS_M1830: 03
CURRENT_FUNCTION: STAND BY ASSIST
ENTERING_EXITING_HOME: STAND BY ASSIST
AMBULATION ASSISTANCE: STAND BY ASSIST

## 2025-04-07 ENCOUNTER — APPOINTMENT (OUTPATIENT)
Dept: HOME HEALTH SERVICES | Facility: HOME HEALTH | Age: 60
End: 2025-04-07
Payer: COMMERCIAL

## 2025-04-07 VITALS
TEMPERATURE: 98 F | HEART RATE: 90 BPM | DIASTOLIC BLOOD PRESSURE: 90 MMHG | RESPIRATION RATE: 18 BRPM | OXYGEN SATURATION: 98 % | SYSTOLIC BLOOD PRESSURE: 118 MMHG

## 2025-04-07 PROCEDURE — G0157 HHC PT ASSISTANT EA 15: HCPCS | Mod: CQ

## 2025-04-07 ASSESSMENT — ENCOUNTER SYMPTOMS
PAIN: 1
HIGHEST PAIN SEVERITY IN PAST 24 HOURS: 7/10
PERSON REPORTING PAIN: PATIENT
LOWEST PAIN SEVERITY IN PAST 24 HOURS: 3/10

## 2025-04-09 ENCOUNTER — HOME CARE VISIT (OUTPATIENT)
Dept: HOME HEALTH SERVICES | Facility: HOME HEALTH | Age: 60
End: 2025-04-09
Payer: COMMERCIAL

## 2025-04-09 VITALS
DIASTOLIC BLOOD PRESSURE: 86 MMHG | HEART RATE: 84 BPM | OXYGEN SATURATION: 98 % | RESPIRATION RATE: 18 BRPM | SYSTOLIC BLOOD PRESSURE: 138 MMHG

## 2025-04-09 DIAGNOSIS — M17.11 PRIMARY OSTEOARTHRITIS OF RIGHT KNEE: ICD-10-CM

## 2025-04-09 PROCEDURE — G0157 HHC PT ASSISTANT EA 15: HCPCS | Mod: CQ

## 2025-04-09 ASSESSMENT — ENCOUNTER SYMPTOMS
PERSON REPORTING PAIN: PATIENT
LOWEST PAIN SEVERITY IN PAST 24 HOURS: 3/10
PAIN: R KNEE
HIGHEST PAIN SEVERITY IN PAST 24 HOURS: 8/10
PAIN: 1

## 2025-04-09 NOTE — TELEPHONE ENCOUNTER
Thank you for calling!  Your refill request(s) for Oxycodone and Tramadol has been sent to Dr. Bryant Lisa for approval.  Please be advised that if you leave a voicemail requesting a refill, you will not receive a call back.  All requests are submitted to the prescriber upon receipt.  Refill requests may take up to 2 business days to complete.    For pickup availability and additional instructions, please contact your local pharmacy.     Please don't hesitate to reach out if you have any additional questions or concerns.    Ivory Valencia MBA, BSN, RN-BC, ONC  Orthopedic Patient Navigator  [facility]   [phone]

## 2025-04-10 RX ORDER — OXYCODONE HYDROCHLORIDE 5 MG/1
5 TABLET ORAL EVERY 6 HOURS PRN
Qty: 28 TABLET | Refills: 0 | Status: SHIPPED | OUTPATIENT
Start: 2025-04-10 | End: 2025-04-17

## 2025-04-10 RX ORDER — TRAMADOL HYDROCHLORIDE 50 MG/1
50 TABLET ORAL EVERY 6 HOURS PRN
Qty: 28 TABLET | Refills: 0 | Status: SHIPPED | OUTPATIENT
Start: 2025-04-10 | End: 2025-04-17

## 2025-04-14 ENCOUNTER — HOME CARE VISIT (OUTPATIENT)
Dept: HOME HEALTH SERVICES | Facility: HOME HEALTH | Age: 60
End: 2025-04-14
Payer: COMMERCIAL

## 2025-04-14 VITALS
TEMPERATURE: 97.8 F | RESPIRATION RATE: 18 BRPM | HEART RATE: 82 BPM | SYSTOLIC BLOOD PRESSURE: 126 MMHG | OXYGEN SATURATION: 98 % | DIASTOLIC BLOOD PRESSURE: 86 MMHG

## 2025-04-14 PROCEDURE — G0157 HHC PT ASSISTANT EA 15: HCPCS | Mod: CQ

## 2025-04-14 ASSESSMENT — ENCOUNTER SYMPTOMS
LOWEST PAIN SEVERITY IN PAST 24 HOURS: 2/10
PERSON REPORTING PAIN: PATIENT
HIGHEST PAIN SEVERITY IN PAST 24 HOURS: 5/10
PAIN: 1

## 2025-04-17 ENCOUNTER — HOME CARE VISIT (OUTPATIENT)
Dept: HOME HEALTH SERVICES | Facility: HOME HEALTH | Age: 60
End: 2025-04-17
Payer: COMMERCIAL

## 2025-04-17 VITALS — TEMPERATURE: 97.6 F | DIASTOLIC BLOOD PRESSURE: 76 MMHG | SYSTOLIC BLOOD PRESSURE: 126 MMHG

## 2025-04-17 PROCEDURE — G0151 HHCP-SERV OF PT,EA 15 MIN: HCPCS

## 2025-04-17 ASSESSMENT — ENCOUNTER SYMPTOMS
LOWEST PAIN SEVERITY IN PAST 24 HOURS: 1/10
PAIN LOCATION - PAIN SEVERITY: 3/10
PAIN: 1
MUSCLE WEAKNESS: 1
SUBJECTIVE PAIN PROGRESSION: GRADUALLY IMPROVING
HIGHEST PAIN SEVERITY IN PAST 24 HOURS: 6/10
PERSON REPORTING PAIN: PATIENT
LIMITED RANGE OF MOTION: 1

## 2025-04-17 ASSESSMENT — ACTIVITIES OF DAILY LIVING (ADL)
HOME_HEALTH_OASIS: 00
OASIS_M1830: 01

## 2025-04-17 NOTE — PROGRESS NOTES
KADI Dave, PAVincentC, ATC  Orthopedic Physician Assisant  Adult Reconstruction and Total Joint Replacement  General Orthopedics  Department of Orthopaedic Surgery  [place] [address]      RANDALL RickettsC

## 2025-04-23 ENCOUNTER — EVALUATION (OUTPATIENT)
Dept: PHYSICAL THERAPY | Facility: CLINIC | Age: 60
End: 2025-04-23
Payer: COMMERCIAL

## 2025-04-23 DIAGNOSIS — M17.11 PRIMARY OSTEOARTHRITIS OF RIGHT KNEE: ICD-10-CM

## 2025-04-23 PROCEDURE — 97140 MANUAL THERAPY 1/> REGIONS: CPT | Mod: GP | Performed by: PHYSICAL THERAPIST

## 2025-04-23 PROCEDURE — 97110 THERAPEUTIC EXERCISES: CPT | Mod: GP | Performed by: PHYSICAL THERAPIST

## 2025-04-23 PROCEDURE — 97161 PT EVAL LOW COMPLEX 20 MIN: CPT | Mod: GP | Performed by: PHYSICAL THERAPIST

## 2025-04-23 NOTE — PROGRESS NOTES
Physical Therapy  Physical Therapy Orthopedic Evaluation    Patient Name: Tye Rowley  MRN: [id number]  Today's Date: 4/23/2025                  Insurance:  Visit number: 1  Authorization info: No auth  Insurance Type: Payor: ANTHEM / Plan: ANTHEM HMP / Product Type: *No Product type* /      Current Problem     Problem List Items Addressed This Visit           ICD-10-CM    Primary osteoarthritis of right knee M17.11       Surgery:4/2/25 R TKA Dr. Lisa    Referred by: Nely Hutton PA-C      Precautions:   L TKA 2017    Subjective:   Subjective   Chief Complaint: R TKA- 4/2/25  Onset: chronic  JEREMIAH: gradual worsening    General:     Current Condition:   Better    Pain:     Location: R knee  Rating: Best-2, Current-4, Worst-7  Description: throbbing  Aggravating Factors:  bending, straightening, pressure   Relieving Factors:  ibuprofen, oxy, icing     Relevant Information (PMH & Previous Tests/Imaging): + imaging prior to surgery    Previous Interventions/Treatments: Physical Therapy    Prior Level of Function (PLOF)  Patient previously independent with all ADLs  Exercise/Physical Activity: hiking  Work/School: -     Patients Living Environment: Reviewed and no concern  Primary Language: English  Patient's Goal(s) for Therapy: walk normal, kneeling     Red Flags: Do you have any of the following? No  Fever/chills, unexplained weight changes, dizziness/fainting, unexplained change in bowel or bladder functions, unexplained malaise or muscle weakness, night pain/sweats, numbness or tingling    Objective:  Objective   L knee 40.8cm supra, 41 cm mid  R knee 44cm mid patella, 401 cm supra  Palpation/Observation  Mild soreness medial hamstring and anterior thigh  Posture  Mildly flexed R knee in standing  Special Testing    ROM  4/23/2025  R knee 0-102 degree AAROM, AROM 0-95 deg  Patellar mobility with small restriction superior to inferior  L knee supine 0-110 AROM  No  "restriction at the hip or ankles   Strength  4/23/2025  3/5 knee flex/ext, hip flex  Quad set fair  Sensation  full  Reflexes      Transfers: slow, requires UE assist  Gait: slower, using Single crutch, reciprocal pattern  SL Balance:   DL Squat: 30 sec sit to stand 7, can perform without UE assist  SL Squat:      Outcome Measures:  Other Measures  Lower Extremity Funtional Score (LEFS): 28   4/23/2025  Treatments:  Ther-EX:  - Supine Heel Slide with Strap  - 3 x daily - 2 sets - 10 reps - 2 hold  - Sitting Heel Slide with Towel  - 3 x daily - 2 sets - 10 reps - 2 hold  - Supine Hamstring Stretch  - 3 x daily - 3 reps - 30\" hold  - Long Sitting Quad Set with Towel Roll Under Heel  - 3 x daily - 1 sets - 10 reps - 5 hold  - Seated Knee Extension Stretch with Chair  - 2-3 x daily  - Supine Knee Extension Stretch on Towel Roll  - 2-3 x daily  - Standing Terminal Knee Extension with Resistance  - 1 x daily - 2 sets - 10 reps - 3 hold  - Recumbent Bike  - 1 x daily 5 minutes seat 6    Manual:  STM medial and posterior hamstring  Modalities:  Declined, emphasis on ice at home               EDUCATION: Home exercise program, plan of care, activity modifications, pain management, and injury pathology     Code:  [id number]    Medical History Form: Reviewed (scanned into chart)  Reviewed medical form, [place] [employer], Falls risk, [place] beliefs, PHQ     Screening  Frequency  Date Last Completed   Spiritual and Cultural Beliefs   Screening each visit or episode of care 4/23/2025   Falls Risk Screening every ambulatory visit    Pain Screening annually at primary care visit  8/4/2023   Domestic Violence screening annually at primary care visit    Depression Screening annually in the primary care setting 4/2/2025   Suicide Risk Screening annually in the primary care setting 4/2/2025   Nutrition and Food Insecurity   Screening at least annually at primary care visit  4/2/2025   Key Learner annually in the primary care setting " 4/23/2025   Drug Screen  4/2/2025  5:54 AM   Alcohol Screen  4/2/2025  5:54 AM   Advance Directive                       Assessment: Patient presents with signs and symptoms consistent with R TKA, resulting in limited participation in pain-free ADLs and inability to perform at their prior level of function. Pt would benefit from physical therapy to address the impairments found & listed previously in the objective section in order to return to safe and pain-free ADLs and prior level of function.     Complexity:Low  Prognosis: good  Response to care: good  Clinical Presentation: Stable and/or uncomplicated characteristics  Personal Factors: None    Problem List:  Pain  Function  Mobility  Strength  Plan:     Planned Interventions include: therapeutic exercise, self-care home management, manual therapy, therapeutic activities, gait training, neuromuscular coordination, vasopneumatic, dry needling, aquatic therapy    Next Treatment:progress knee ROM, shuttle press, SAQ, LAQ, emphasis on terminal extension  Frequency: 2 x Week  Duration: 8 Weeks  Goals: Set and discussed today  4 weeks  Patient to have pain less than 4/10 for improved end range flexion  Patient to be independent with HEP and progression for improved carryover  Improved ability to perform sit to stand without UE assist, 7 reps 30 sec sit to stand  Improved tolerance with knee flexion to 120deg AAROM    8 weeks  Patient to have improved LEFS score by 15 points for improved function at home  Patient to have pain less than 2/10 for improved ADL performance  Improved ability to navigate stairs reciprocally with no single UE assist for better function with daily activities  ROM 0-125 improved to for participation in outdoor activities      Plan of care was developed with input and agreement by the patient      Gaudencio Blevins, PT

## 2025-04-25 ENCOUNTER — TREATMENT (OUTPATIENT)
Dept: PHYSICAL THERAPY | Facility: CLINIC | Age: 60
End: 2025-04-25
Payer: COMMERCIAL

## 2025-04-25 DIAGNOSIS — M17.11 PRIMARY OSTEOARTHRITIS OF RIGHT KNEE: ICD-10-CM

## 2025-04-25 PROCEDURE — 97110 THERAPEUTIC EXERCISES: CPT | Mod: GP | Performed by: PHYSICAL THERAPIST

## 2025-04-25 NOTE — PROGRESS NOTES
Physical Therapy  Physical Therapy Orthopedic Evaluation    Patient Name: Tye Rowley  MRN: [id number]  Today's Date: 4/25/2025  Time Calculation  Start Time: 0748  Stop Time: 0829  Time Calculation (min): 41 min       PT Therapeutic Procedures Time Entry  Therapeutic Exercise Time Entry: 39       Insurance:  Visit number: 2  Authorization info: No auth  Insurance Type: Payor: ANTHEM / Plan: ANTHEM HMP / Product Type: *No Product type* /      Current Problem     Problem List Items Addressed This Visit           ICD-10-CM    Primary osteoarthritis of right knee M17.11       Surgery:4/2/25 R TKA Dr. Lisa    Referred by: Nely Hutton PA-C      Precautions:   L TKA 2017    Subjective:   Subjective   Ibuprofen, working from home, other pain medication if he is sore after the workouts. No return to work date. Exercises are going well at home, doing them a couple times.     General:     Current Condition:   Better    Pain:     Location: R knee  Rating: Best-2, Current-4, Worst-7  Description: throbbing  Aggravating Factors:  bending, straightening, pressure   Relieving Factors:  ibuprofen, oxy, icing     Relevant Information (PMH & Previous Tests/Imaging): + imaging prior to surgery    Previous Interventions/Treatments: Physical Therapy    Prior Level of Function (PLOF)  Patient previously independent with all ADLs  Exercise/Physical Activity: hiking  Work/School: -     Patients Living Environment: Reviewed and no concern  Primary Language: English  Patient's Goal(s) for Therapy: walk normal, kneeling     Red Flags: Do you have any of the following? No  Fever/chills, unexplained weight changes, dizziness/fainting, unexplained change in bowel or bladder functions, unexplained malaise or muscle weakness, night pain/sweats, numbness or tingling    Objective:  Objective   L knee 40.8 cm supra, 41 cm mid  R knee 44 cm mid patella, 401 cm supra  Palpation/Observation  Mild  "soreness medial hamstring and anterior thigh  Posture  Mildly flexed R knee in standing  Special Testing    ROM  4/25/2025  R knee 0-102 degree AAROM, AROM 0-95 deg  Patellar mobility with small restriction superior to inferior  L knee supine 0-110 AROM  No restriction at the hip or ankles   Strength  4/25/2025  3/5 knee flex/ext, hip flex  Quad set fair  Sensation  full  Reflexes    Transfers: slow, requires UE assist  Gait: slower, using Single crutch, reciprocal pattern  SL Balance:   DL Squat: 30 sec sit to stand 7, can perform without UE assist  SL Squat:      Outcome Measures:      4/25/2025  Treatments:  - bike, 7 min   - supine heels slides x10, around 10 seconds  - seated heel slides x10, around 10 seconds  - supine long sitting quad sets 10x10 sec  - seated long arc quad 10x10 sec  - terminal knee extension 10x10 sec  - sit to stands, no UE  - shuttle 25 pounds, x20    Ther-EX:  - Supine Heel Slide with Strap  - 3 x daily - 2 sets - 10 reps - 2 hold  - Sitting Heel Slide with Towel  - 3 x daily - 2 sets - 10 reps - 2 hold  - Supine Hamstring Stretch  - 3 x daily - 3 reps - 30\" hold  - Long Sitting Quad Set with Towel Roll Under Heel  - 3 x daily - 1 sets - 10 reps - 5 hold  - Seated Knee Extension Stretch with Chair  - 2-3 x daily  - Supine Knee Extension Stretch on Towel Roll  - 2-3 x daily  - Standing Terminal Knee Extension with Resistance  - 1 x daily - 2 sets - 10 reps - 3 hold  - Recumbent Bike  - 1 x daily 5 minutes seat 6    Manual:  STM medial and posterior hamstring  Modalities:  Declined, emphasis on ice at home       PT Therapeutic Procedures Time Entry  Therapeutic Exercise Time Entry: 39       EDUCATION: Home exercise program, plan of care, activity modifications, pain management, and injury pathology     Code:  [id number]    Medical History Form: Reviewed (scanned into chart)  Reviewed medical form, [place], Falls risk, Religion beliefs, PHQ     Screening  Frequency  Date Last Completed "   Spiritual and Cultural Beliefs   Screening each visit or episode of care 4/23/2025   Falls Risk Screening every ambulatory visit    Pain Screening annually at primary care visit  8/4/2023   Domestic Violence screening annually at primary care visit    Depression Screening annually in the primary care setting 4/2/2025   Suicide Risk Screening annually in the primary care setting 4/2/2025   Nutrition and Food Insecurity   Screening at least annually at primary care visit  4/2/2025   Key Learner annually in the primary care setting 4/23/2025   Drug Screen  4/2/2025  5:54 AM   Alcohol Screen  4/2/2025  5:54 AM   Advance Directive       Time Calculation  Start Time: 0748  Stop Time: 0829  Time Calculation (min): 41 min    PT Therapeutic Procedures Time Entry  Therapeutic Exercise Time Entry: 39          Assessment: Excellent progression in left knee ROM. Quad still weak, lag noted during right knee extension.     Complexity:Low  Prognosis: good  Response to care: good  Clinical Presentation: Stable and/or uncomplicated characteristics  Personal Factors: None    Problem List:  Pain  Function  Mobility  Strength  Plan:     Planned Interventions include: therapeutic exercise, self-care home management, manual therapy, therapeutic activities, gait training, neuromuscular coordination, vasopneumatic, dry needling, aquatic therapy    Next Treatment:progress knee ROM, shuttle press, SAQ, LAQ, emphasis on terminal extension  Frequency: 2 x Week  Duration: 8 Weeks  Goals: Set and discussed today  4 weeks  Patient to have pain less than 4/10 for improved end range flexion  Patient to be independent with HEP and progression for improved carryover  Improved ability to perform sit to stand without UE assist, 7 reps 30 sec sit to stand  Improved tolerance with knee flexion to 120deg AAROM    8 weeks  Patient to have improved LEFS score by 15 points for improved function at home  Patient to have pain less than 2/10 for improved ADL  performance  Improved ability to navigate stairs reciprocally with no single UE assist for better function with daily activities  ROM 0-125 improved to for participation in outdoor activities      Plan of care was developed with input and agreement by the patient      Jeronimo Grijalva PT

## 2025-04-30 ENCOUNTER — TREATMENT (OUTPATIENT)
Dept: PHYSICAL THERAPY | Facility: CLINIC | Age: 60
End: 2025-04-30
Payer: COMMERCIAL

## 2025-04-30 DIAGNOSIS — M17.11 PRIMARY OSTEOARTHRITIS OF RIGHT KNEE: ICD-10-CM

## 2025-04-30 PROCEDURE — 97110 THERAPEUTIC EXERCISES: CPT | Mod: GP | Performed by: PHYSICAL THERAPIST

## 2025-04-30 PROCEDURE — 97140 MANUAL THERAPY 1/> REGIONS: CPT | Mod: GP | Performed by: PHYSICAL THERAPIST

## 2025-04-30 NOTE — PROGRESS NOTES
Physical Therapy  Physical Therapy Orthopedic Evaluation    Patient Name: Tye Rowley  MRN: [id number]  Today's Date: 4/30/2025  Time Calculation  Start Time: 0900  Stop Time: 0943  Time Calculation (min): 43 min       PT Therapeutic Procedures Time Entry  Manual Therapy Time Entry: 10  Therapeutic Exercise Time Entry: 33       Insurance:  Visit number: 3  Authorization info: No auth  Insurance Type: Payor: ANTHEM / Plan: ANTHEM HMP / Product Type: *No Product type* /      Current Problem     Problem List Items Addressed This Visit           ICD-10-CM    Primary osteoarthritis of right knee M17.11       Surgery:4/2/25 R TKA Dr. Lisa    Referred by: Nely Hutton PA-C      Precautions:   L TKA 2017    Subjective:   Subjective   Ibuprofen at night to sleep, overall doing good, feels a little stiff on the bike at home     General:     Current Condition:   Better    Pain:     Location: R knee  Rating: Best-2, Current-4, Worst-7  Description: throbbing  Aggravating Factors:  bending, straightening, pressure   Relieving Factors:  ibuprofen, oxy, icing     Relevant Information (PMH & Previous Tests/Imaging): + imaging prior to surgery    Previous Interventions/Treatments: Physical Therapy    Prior Level of Function (PLOF)  Patient previously independent with all ADLs  Exercise/Physical Activity: hiking  Work/School: -     Patients Living Environment: Reviewed and no concern  Primary Language: English  Patient's Goal(s) for Therapy: walk normal, kneeling     Objective:  Objective   L knee 40.8 cm supra, 41 cm mid  R knee 44 cm mid patella, 401 cm supra  Palpation/Observation  Mild soreness medial hamstring and anterior thigh  Posture  Mildly flexed R knee in standing  ROM  4/30/2025  L knee 0-109 AAROM      Strength  4/30/2025  3/5 knee flex/ext, hip flex  Quad set fair  Sensation  full  Reflexes    Transfers: slow, requires UE assist  Gait: slower, using Single crutch,  "reciprocal pattern  DL Squat: 30 sec sit to stand 7, can perform without UE assist    Treatments:  Ther-EX:  - bike, 7 min   -swiss ball flexion 2 x 15   - SLR flexion x 15  - seated LAQ x 10 5\" hold   Standing TKE RTB x 15 5\"  Shuttle press 43lbs 2 x 15        Manual:  STM medial and posterior hamstring  Modalities:  Declined, emphasis on ice at home       PT Therapeutic Procedures Time Entry  Manual Therapy Time Entry: 10  Therapeutic Exercise Time Entry: 33       EDUCATION: Home exercise program, plan of care, activity modifications, pain management, and injury pathology     Code:  [id number]    Medical History Form: Reviewed (scanned into chart)  Reviewed medical form, Key [place], Falls risk, Uatsdin beliefs, PHQ     Screening  Frequency  Date Last Completed   Spiritual and Cultural Beliefs   Screening each visit or episode of care 4/23/2025   Falls Risk Screening every ambulatory visit    Pain Screening annually at primary care visit  8/4/2023   Domestic Violence screening annually at primary care visit    Depression Screening annually in the primary care setting 4/2/2025   Suicide Risk Screening annually in the primary care setting 4/2/2025   Nutrition and Food Insecurity   Screening at least annually at primary care visit  4/2/2025   Key Learner annually in the primary care setting 4/23/2025   Drug Screen  4/2/2025  5:54 AM   Alcohol Screen  4/2/2025  5:54 AM   Advance Directive       Time Calculation  Start Time: 0900  Stop Time: 0943  Time Calculation (min): 43 min    PT Therapeutic Procedures Time Entry  Manual Therapy Time Entry: 10  Therapeutic Exercise Time Entry: 33          Assessment: Mild tightness end range flexion, still challenged with quad activities against gravity. Continues to be motivated, progressing well with ROM and strength     Complexity:Low  Prognosis: good  Response to care: good  Clinical Presentation: Stable and/or uncomplicated characteristics  Personal Factors: None    Problem " List:  Pain  Function  Mobility  Strength  Plan:     Planned Interventions include: therapeutic exercise, self-care home management, manual therapy, therapeutic activities, gait training, neuromuscular coordination, vasopneumatic, dry needling, aquatic therapy    Next Treatment:progress knee ROM, shuttle press, SAQ, LAQ, emphasis on terminal extension  Frequency: 2 x Week  Duration: 8 Weeks  Goals: Set and discussed today  4 weeks  Patient to have pain less than 4/10 for improved end range flexion  Patient to be independent with HEP and progression for improved carryover  Improved ability to perform sit to stand without UE assist, 7 reps 30 sec sit to stand  Improved tolerance with knee flexion to 120deg AAROM    8 weeks  Patient to have improved LEFS score by 15 points for improved function at home  Patient to have pain less than 2/10 for improved ADL performance  Improved ability to navigate stairs reciprocally with no single UE assist for better function with daily activities  ROM 0-125 improved to for participation in outdoor activities      Plan of care was developed with input and agreement by the patient      Gaudencio Blevins PT

## 2025-05-02 ENCOUNTER — TREATMENT (OUTPATIENT)
Dept: PHYSICAL THERAPY | Facility: CLINIC | Age: 60
End: 2025-05-02
Payer: COMMERCIAL

## 2025-05-02 DIAGNOSIS — M17.11 PRIMARY OSTEOARTHRITIS OF RIGHT KNEE: ICD-10-CM

## 2025-05-02 DIAGNOSIS — Z96.651 S/P TKR (TOTAL KNEE REPLACEMENT) USING CEMENT, RIGHT: Primary | ICD-10-CM

## 2025-05-02 PROCEDURE — 97110 THERAPEUTIC EXERCISES: CPT | Mod: GP | Performed by: PHYSICAL THERAPIST

## 2025-05-02 PROCEDURE — 97140 MANUAL THERAPY 1/> REGIONS: CPT | Mod: GP | Performed by: PHYSICAL THERAPIST

## 2025-05-02 RX ORDER — OXYCODONE HYDROCHLORIDE 5 MG/1
5 TABLET ORAL EVERY 6 HOURS PRN
Qty: 28 TABLET | Refills: 0 | Status: SHIPPED | OUTPATIENT
Start: 2025-05-02 | End: 2025-05-09

## 2025-05-02 RX ORDER — TRAMADOL HYDROCHLORIDE 50 MG/1
50 TABLET ORAL EVERY 6 HOURS PRN
Qty: 28 TABLET | Refills: 0 | Status: SHIPPED | OUTPATIENT
Start: 2025-05-02 | End: 2025-05-09

## 2025-05-02 NOTE — PROGRESS NOTES
"  Physical Therapy  Physical Therapy Orthopedic Evaluation    Patient Name: Tye Rowley  MRN: [id number]  Today's Date: 5/2/2025  Time Calculation  Start Time: 0930  Stop Time: 1000  Time Calculation (min): 30 min       PT Therapeutic Procedures Time Entry  Manual Therapy Time Entry: 15  Therapeutic Exercise Time Entry: 15       Insurance:  Visit number: 4  Authorization info: No auth  Insurance Type: Payor: ANTHEM / Plan: ANTHEM HMP / Product Type: *No Product type* /      Current Problem     Problem List Items Addressed This Visit           ICD-10-CM    Primary osteoarthritis of right knee M17.11       Surgery:4/2/25 R TKA Dr. Lisa    Referred by: Nely Hutton PA-C      Precautions:   L TKA 2017    Subjective:   Subjective   Istill struggling with sleeping other wise doing OK    General:     Current Condition:   Better    Pain:     Location: R knee  3/10    Relevant Information (PMH & Previous Tests/Imaging): + imaging prior to surgery    Previous Interventions/Treatments: Physical Therapy    Prior Level of Function (PLOF)  Patient previously independent with all ADLs  Exercise/Physical Activity: hiking  Work/School: -     Patients Living Environment: Reviewed and no concern  Primary Language: English  Patient's Goal(s) for Therapy: walk normal, kneeling     Objective:  Objective   L knee 40.8 cm supra, 41 cm mid  R knee 44 cm mid patella, 401 cm supra  Palpation/Observation  Mild soreness medial hamstring and anterior thigh  Posture  Mildly flexed R knee in standing  ROM  5/2/2025  L knee 0-115 AAROM      Strength  5/2/2025  3/5 knee flex/ext, hip flex  Quad set fair  Sensation  full  Reflexes    Transfers: slow, requires UE assist  Gait: slower, using Single crutch, reciprocal pattern  DL Squat: 30 sec sit to stand 7, can perform without UE assist    Treatments:  Ther-EX:  - bike, 7 min   -Prone quad stretch 3 x 20\"  -reverse TKE BLK 2 x 10    Step ups 6\" 2 x 10, " "step down 4\" x 15        Manual:  STM medial and posterior hamstring  Modalities:  Declined, emphasis on ice at home       PT Therapeutic Procedures Time Entry  Manual Therapy Time Entry: 15  Therapeutic Exercise Time Entry: 15       EDUCATION: Home exercise program, plan of care, activity modifications, pain management, and injury pathology     Code:  [id number]    Medical History Form: Reviewed (scanned into chart)  Reviewed medical form, Key [place], Falls risk, Jew beliefs, PHQ     Screening  Frequency  Date Last Completed   Spiritual and Cultural Beliefs   Screening each visit or episode of care 4/23/2025   Falls Risk Screening every ambulatory visit    Pain Screening annually at primary care visit  8/4/2023   Domestic Violence screening annually at primary care visit    Depression Screening annually in the primary care setting 4/2/2025   Suicide Risk Screening annually in the primary care setting 4/2/2025   Nutrition and Food Insecurity   Screening at least annually at primary care visit  4/2/2025   Key Learner annually in the primary care setting 4/23/2025   Drug Screen  4/2/2025  5:54 AM   Alcohol Screen  4/2/2025  5:54 AM   Advance Directive       Time Calculation  Start Time: 0930  Stop Time: 1000  Time Calculation (min): 30 min    PT Therapeutic Procedures Time Entry  Manual Therapy Time Entry: 15  Therapeutic Exercise Time Entry: 15          Assessment: Mild tightness end range flexion, decreased quad strength requiring AA and PROM overpressure to achieve full extension     Complexity:Low  Prognosis: good  Response to care: good  Clinical Presentation: Stable and/or uncomplicated characteristics  Personal Factors: None    Problem List:  Pain  Function  Mobility  Strength  Plan:     Planned Interventions include: therapeutic exercise, self-care home management, manual therapy, therapeutic activities, gait training, neuromuscular coordination, vasopneumatic, dry needling, aquatic therapy    Next " Treatment:progress knee ROM, shuttle press, SAQ, LAQ, emphasis on terminal extension  Frequency: 2 x Week  Duration: 8 Weeks  Goals: Set and discussed today  4 weeks  Patient to have pain less than 4/10 for improved end range flexion  Patient to be independent with HEP and progression for improved carryover  Improved ability to perform sit to stand without UE assist, 7 reps 30 sec sit to stand  Improved tolerance with knee flexion to 120deg AAROM    8 weeks  Patient to have improved LEFS score by 15 points for improved function at home  Patient to have pain less than 2/10 for improved ADL performance  Improved ability to navigate stairs reciprocally with no single UE assist for better function with daily activities  ROM 0-125 improved to for participation in outdoor activities      Plan of care was developed with input and agreement by the patient      Gaudencio Blevins PT

## 2025-05-05 ENCOUNTER — APPOINTMENT (OUTPATIENT)
Dept: PHYSICAL THERAPY | Facility: CLINIC | Age: 60
End: 2025-05-05
Payer: COMMERCIAL

## 2025-05-06 ENCOUNTER — TREATMENT (OUTPATIENT)
Dept: PHYSICAL THERAPY | Facility: CLINIC | Age: 60
End: 2025-05-06
Payer: COMMERCIAL

## 2025-05-06 DIAGNOSIS — M17.11 PRIMARY OSTEOARTHRITIS OF RIGHT KNEE: ICD-10-CM

## 2025-05-06 PROCEDURE — 97140 MANUAL THERAPY 1/> REGIONS: CPT | Mod: GP | Performed by: PHYSICAL THERAPIST

## 2025-05-06 PROCEDURE — 97110 THERAPEUTIC EXERCISES: CPT | Mod: GP | Performed by: PHYSICAL THERAPIST

## 2025-05-06 NOTE — PROGRESS NOTES
Physical Therapy  Physical Therapy Orthopedic Evaluation    Patient Name: Tye Rowley  MRN: [id number]  Today's Date: 5/6/2025  Time Calculation  Start Time: 1130  Stop Time: 1213  Time Calculation (min): 43 min       PT Therapeutic Procedures Time Entry  Manual Therapy Time Entry: 25  Therapeutic Exercise Time Entry: 15       Insurance:  Visit number: 5  Authorization info: No auth  Insurance Type: Payor: ANTHEM / Plan: ANTHEM HMP / Product Type: *No Product type* /      Current Problem     Problem List Items Addressed This Visit           ICD-10-CM    Primary osteoarthritis of right knee M17.11       Surgery:4/2/25 R TKA Dr. Lisa    Referred by: Nely Hutton PA-C      Precautions:   L TKA 2017    Subjective:   Subjective   Has a lot more pain, thinks he twisted his knee while sleeping, feels like he is struggling with his mobility     General:     Current Condition:   Better    Pain:     Location: R knee  7/10    Relevant Information (PMH & Previous Tests/Imaging): + imaging prior to surgery    Previous Interventions/Treatments: Physical Therapy    Prior Level of Function (PLOF)  Patient previously independent with all ADLs  Exercise/Physical Activity: hiking  Work/School: -     Patients Living Environment: Reviewed and no concern  Primary Language: English  Patient's Goal(s) for Therapy: walk normal, kneeling     Objective:  Objective   L knee 40.8 cm supra, 41 cm mid  R knee 44 cm mid patella, 401 cm supra  Palpation/Observation  Mild soreness medial hamstring and anterior thigh  Posture  Mildly flexed R knee in standing  ROM  5/6/2025  L knee 0-108 AAROM  Strength  5/6/2025  3/5 knee flex/ext, hip flex  Quad set fair  Sensation  full  Reflexes    Transfers: slow, requires UE assist  Gait: slower, using Single crutch, reciprocal pattern  DL Squat: 30 sec sit to stand 7, can perform without UE assist    Treatments:  Ther-EX:  - bike, 7 min   -heel slides x 20  SLR  flex,abd, and add 2x 10  Swiss ball flexion x 20    Manual:  STM lateral quad it band, PROM R knee flex and ext  Modalities:  Gamerready x 10 medium compression       PT Therapeutic Procedures Time Entry  Manual Therapy Time Entry: 25  Therapeutic Exercise Time Entry: 15       EDUCATION: Home exercise program, plan of care, activity modifications, pain management, and injury pathology     Code:  [id number]    Medical History Form: Reviewed (scanned into chart)  Reviewed medical form, Key [place], Falls risk, Religion beliefs, PHQ     Screening  Frequency  Date Last Completed   Spiritual and Cultural Beliefs   Screening each visit or episode of care 4/23/2025   Falls Risk Screening every ambulatory visit    Pain Screening annually at primary care visit  8/4/2023   Domestic Violence screening annually at primary care visit    Depression Screening annually in the primary care setting 4/2/2025   Suicide Risk Screening annually in the primary care setting 4/2/2025   Nutrition and Food Insecurity   Screening at least annually at primary care visit  4/2/2025   Key Learner annually in the primary care setting 4/23/2025   Drug Screen  4/2/2025  5:54 AM   Alcohol Screen  4/2/2025  5:54 AM   Advance Directive       Time Calculation  Start Time: 1130  Stop Time: 1213  Time Calculation (min): 43 min    PT Therapeutic Procedures Time Entry  Manual Therapy Time Entry: 25  Therapeutic Exercise Time Entry: 15          Assessment: Mild tightness end range flexion, increased suprapatellar swelling, no increased pain or weakness with knee extension, flexion, and slr flexion. Discussed ice/elevation, crutch for gait antalgia, and focus on ROM only until symptoms calm down     Complexity:Low  Prognosis: good  Response to care: good  Clinical Presentation: Stable and/or uncomplicated characteristics  Personal Factors: None    Problem List:  Pain  Function  Mobility  Strength  Plan:     Planned Interventions include: therapeutic exercise,  self-care home management, manual therapy, therapeutic activities, gait training, neuromuscular coordination, vasopneumatic, dry needling, aquatic therapy    Next Treatment:progress knee ROM, shuttle press, SAQ, LAQ, emphasis on terminal extension  Frequency: 2 x Week  Duration: 8 Weeks  Goals: Set and discussed today  4 weeks  Patient to have pain less than 4/10 for improved end range flexion  Patient to be independent with HEP and progression for improved carryover  Improved ability to perform sit to stand without UE assist, 7 reps 30 sec sit to stand  Improved tolerance with knee flexion to 120deg AAROM    8 weeks  Patient to have improved LEFS score by 15 points for improved function at home  Patient to have pain less than 2/10 for improved ADL performance  Improved ability to navigate stairs reciprocally with no single UE assist for better function with daily activities  ROM 0-125 improved to for participation in outdoor activities      Plan of care was developed with input and agreement by the patient      Gaudencio Blevins PT

## 2025-05-09 ENCOUNTER — TREATMENT (OUTPATIENT)
Dept: PHYSICAL THERAPY | Facility: CLINIC | Age: 60
End: 2025-05-09
Payer: COMMERCIAL

## 2025-05-09 DIAGNOSIS — M17.11 PRIMARY OSTEOARTHRITIS OF RIGHT KNEE: ICD-10-CM

## 2025-05-09 PROCEDURE — 97140 MANUAL THERAPY 1/> REGIONS: CPT | Mod: GP | Performed by: PHYSICAL THERAPIST

## 2025-05-09 PROCEDURE — 97110 THERAPEUTIC EXERCISES: CPT | Mod: GP | Performed by: PHYSICAL THERAPIST

## 2025-05-09 NOTE — PROGRESS NOTES
Physical Therapy  Physical Therapy Orthopedic Evaluation    Patient Name: Tye Rowley  MRN: [id number]  Today's Date: 5/9/2025  Time Calculation  Start Time: 1145  Stop Time: 1230  Time Calculation (min): 45 min       PT Therapeutic Procedures Time Entry  Manual Therapy Time Entry: 15  Therapeutic Exercise Time Entry: 25       Insurance:  Visit number: 6  Authorization info: No auth  Insurance Type: Payor: ANTHEM / Plan: ANTHEM HMP / Product Type: *No Product type* /      Current Problem     Problem List Items Addressed This Visit           ICD-10-CM    Primary osteoarthritis of right knee M17.11       Surgery:4/2/25 R TKA Dr. Lisa    Referred by: Nely Hutton PA-C      Precautions:   L TKA 2017    Subjective:   Subjective   Pain is better, notices some buckling on stairs and uneven surfaces. Not as swollen    General:     Current Condition:   Better    Pain:     Location: R knee  7/10    Relevant Information (PMH & Previous Tests/Imaging): + imaging prior to surgery    Previous Interventions/Treatments: Physical Therapy    Prior Level of Function (PLOF)  Patient previously independent with all ADLs  Exercise/Physical Activity: hiking  Work/School: -     Patients Living Environment: Reviewed and no concern  Primary Language: English  Patient's Goal(s) for Therapy: walk normal, kneeling     Objective:  Objective   L knee 40.8 cm supra, 41 cm mid  R knee 44 cm mid patella, 401 cm supra  Palpation/Observation  Mild soreness medial hamstring and anterior thigh  Posture  Mildly flexed R knee in standing  ROM  5/9/2025  L knee 0-110 AAROM  Strength  5/9/2025  3/5 knee flex/ext, hip flex  Quad set fair  Sensation  full  Reflexes    Transfers: slow, requires UE assist  Gait: slower, using Single crutch, reciprocal pattern  DL Squat: 30 sec sit to stand 7, can perform without UE assist    Treatments:  Ther-EX:  - bike, 7 min   -heel slides x 20  SLR flex, 1 .5lb 2 x 10  SAQ 2  lb 2 x 10  Sit to stand 3 x 5 arms crossed chest  Swiss ball flexion x 20    Gait mechanics   Manual:  STM lateral quad it band, PROM R knee flex and ext  Modalities:       PT Therapeutic Procedures Time Entry  Manual Therapy Time Entry: 15  Therapeutic Exercise Time Entry: 25       EDUCATION: Home exercise program, plan of care, activity modifications, pain management, and injury pathology     Code:  [id number]    Medical History Form: Reviewed (scanned into chart)  Reviewed medical form, Key [place], Falls risk, Confucianism beliefs, PHQ     Screening  Frequency  Date Last Completed   Spiritual and Cultural Beliefs   Screening each visit or episode of care 4/23/2025   Falls Risk Screening every ambulatory visit    Pain Screening annually at primary care visit  8/4/2023   Domestic Violence screening annually at primary care visit    Depression Screening annually in the primary care setting 4/2/2025   Suicide Risk Screening annually in the primary care setting 4/2/2025   Nutrition and Food Insecurity   Screening at least annually at primary care visit  4/2/2025   Key Learner annually in the primary care setting 4/23/2025   Drug Screen  4/2/2025  5:54 AM   Alcohol Screen  4/2/2025  5:54 AM   Advance Directive       Time Calculation  Start Time: 1145  Stop Time: 1230  Time Calculation (min): 45 min    PT Therapeutic Procedures Time Entry  Manual Therapy Time Entry: 15  Therapeutic Exercise Time Entry: 25          Assessment: Mild supra patella bursa swelling, progressing with ROM, gait imrpoved with VC, no signs of instability, can SLR and perform extension. Overall making progress      Complexity:Low  Prognosis: good  Response to care: good  Clinical Presentation: Stable and/or uncomplicated characteristics  Personal Factors: None    Problem List:  Pain  Function  Mobility  Strength  Plan:     Planned Interventions include: therapeutic exercise, self-care home management, manual therapy, therapeutic activities, gait  training, neuromuscular coordination, vasopneumatic, dry needling, aquatic therapy    Next Treatment:progress knee ROM, shuttle press, SAQ, LAQ, emphasis on terminal extension  Frequency: 2 x Week  Duration: 8 Weeks  Goals: Set and discussed today  4 weeks  Patient to have pain less than 4/10 for improved end range flexion  Patient to be independent with HEP and progression for improved carryover  Improved ability to perform sit to stand without UE assist, 7 reps 30 sec sit to stand  Improved tolerance with knee flexion to 120deg AAROM    8 weeks  Patient to have improved LEFS score by 15 points for improved function at home  Patient to have pain less than 2/10 for improved ADL performance  Improved ability to navigate stairs reciprocally with no single UE assist for better function with daily activities  ROM 0-125 improved to for participation in outdoor activities      Plan of care was developed with input and agreement by the patient      Gaudencio Blevins PT

## 2025-05-13 ENCOUNTER — TREATMENT (OUTPATIENT)
Dept: PHYSICAL THERAPY | Facility: CLINIC | Age: 60
End: 2025-05-13
Payer: COMMERCIAL

## 2025-05-13 DIAGNOSIS — M17.11 PRIMARY OSTEOARTHRITIS OF RIGHT KNEE: ICD-10-CM

## 2025-05-13 PROCEDURE — 97140 MANUAL THERAPY 1/> REGIONS: CPT | Mod: GP | Performed by: PHYSICAL THERAPIST

## 2025-05-13 PROCEDURE — 97110 THERAPEUTIC EXERCISES: CPT | Mod: GP | Performed by: PHYSICAL THERAPIST

## 2025-05-13 NOTE — PROGRESS NOTES
Physical Therapy  Physical Therapy Orthopedic Evaluation    Patient Name: Tye Rowley  MRN: [id number]  Today's Date: 5/13/2025  Time Calculation  Start Time: 1135  Stop Time: 1215  Time Calculation (min): 40 min       PT Therapeutic Procedures Time Entry  Manual Therapy Time Entry: 12  Therapeutic Exercise Time Entry: 28       Insurance:  Visit number: 7  Authorization info: No auth  Insurance Type: Payor: ANTHEM / Plan: ANTHEM HMP / Product Type: *No Product type* /      Current Problem     Problem List Items Addressed This Visit           ICD-10-CM    Primary osteoarthritis of right knee M17.11       Surgery:4/2/25 R TKA Dr. Lisa    Referred by: Nely Hutton PA-C      Precautions:   L TKA 2017    Subjective:   Subjective   Pain is getting better, felt that he had a break through yesterday    General:     Current Condition:   Better    Pain:     Location: R knee  4/10    Relevant Information (PMH & Previous Tests/Imaging): + imaging prior to surgery    Previous Interventions/Treatments: Physical Therapy    Prior Level of Function (PLOF)  Patient previously independent with all ADLs  Exercise/Physical Activity: hiking  Work/School: -     Patients Living Environment: Reviewed and no concern  Primary Language: English  Patient's Goal(s) for Therapy: walk normal, kneeling     Objective:  Objective   L knee 40.8 cm supra, 41 cm mid  R knee 44 cm mid patella, 401 cm supra  Palpation/Observation  Mild soreness medial hamstring and anterior thigh  Posture  Mildly flexed R knee in standing  ROM  5/13/2025  L knee 0-120 AAROM  Strength  5/13/2025  3/5 knee flex/ext, hip flex  Quad set fair  Sensation  full  Reflexes    Transfers: slow, requires UE assist  Gait: slower, using Single crutch, reciprocal pattern  DL Squat: 30 sec sit to stand 7, can perform without UE assist    Treatments:  Ther-EX:  - bike, 7 min   -heel slides x 20  SAQ 2 lb 2 x 10  LAQ 2 lb 2 x 10  Side  stepping RTB below knees 20'   4 in step down 2 x 10  6in controlled step down x 10    Sit to stand 3 x 5 arms crossed chest  Swiss ball flexion x 20    Gait mechanics   Manual:  STM lateral quad it band, PROM R knee flex and ext  Modalities:       PT Therapeutic Procedures Time Entry  Manual Therapy Time Entry: 12  Therapeutic Exercise Time Entry: 28       EDUCATION: Home exercise program, plan of care, activity modifications, pain management, and injury pathology     Code:  [id number]    Medical History Form: Reviewed (scanned into chart)  Reviewed medical form, [place], Falls risk, Episcopalian beliefs, PHQ     Screening  Frequency  Date Last Completed   Spiritual and Cultural Beliefs   Screening each visit or episode of care 4/23/2025   Falls Risk Screening every ambulatory visit    Pain Screening annually at primary care visit  8/4/2023   Domestic Violence screening annually at primary care visit    Depression Screening annually in the primary care setting 4/2/2025   Suicide Risk Screening annually in the primary care setting 4/2/2025   Nutrition and Food Insecurity   Screening at least annually at primary care visit  4/2/2025   [place] Lear[place] annually in the primary care setting 4/23/2025   Drug Screen  4/2/2025  5:54 AM   Alcohol Screen  4/2/2025  5:54 AM   Advance Directive       Time Calculation  Start Time: 1135  Stop Time: 1215  Time Calculation (min): 40 min    PT Therapeutic Procedures Time Entry  Manual Therapy Time Entry: 12  Therapeutic Exercise Time Entry: 28          Assessment: Less swelling and irritation of lateral aspect of R knee, improved knee flexion ROM. Improved tolerance to return to strengthening. Progressing well with less pain. Discussed focus on gait mechanics for improved pattern     Complexity:Low  Prognosis: good  Response to care: good  Clinical Presentation: Stable and/or uncomplicated characteristics  Personal Factors: None    Problem  List:  Pain  Function  Mobility  Strength  Plan:     Planned Interventions include: therapeutic exercise, self-care home management, manual therapy, therapeutic activities, gait training, neuromuscular coordination, vasopneumatic, dry needling, aquatic therapy    Next Treatment:progress knee ROM, shuttle press, SAQ, LAQ, emphasis on terminal extension  Frequency: 2 x Week  Duration: 8 Weeks  Goals: Set and discussed today  4 weeks  Patient to have pain less than 4/10 for improved end range flexion  Patient to be independent with HEP and progression for improved carryover  Improved ability to perform sit to stand without UE assist, 7 reps 30 sec sit to stand  Improved tolerance with knee flexion to 120deg AAROM    8 weeks  Patient to have improved LEFS score by 15 points for improved function at home  Patient to have pain less than 2/10 for improved ADL performance  Improved ability to navigate stairs reciprocally with no single UE assist for better function with daily activities  ROM 0-125 improved to for participation in outdoor activities      Plan of care was developed with input and agreement by the patient      Gaudencio Blevins PT

## 2025-05-15 ENCOUNTER — OFFICE VISIT (OUTPATIENT)
Dept: ORTHOPEDIC SURGERY | Facility: CLINIC | Age: 60
End: 2025-05-15
Payer: COMMERCIAL

## 2025-05-15 ENCOUNTER — HOSPITAL ENCOUNTER (OUTPATIENT)
Dept: RADIOLOGY | Facility: CLINIC | Age: 60
Discharge: HOME | End: 2025-05-15
Payer: COMMERCIAL

## 2025-05-15 DIAGNOSIS — Z47.1 AFTERCARE FOLLOWING RIGHT KNEE JOINT REPLACEMENT SURGERY: ICD-10-CM

## 2025-05-15 DIAGNOSIS — Z96.651 AFTERCARE FOLLOWING RIGHT KNEE JOINT REPLACEMENT SURGERY: ICD-10-CM

## 2025-05-15 PROCEDURE — 99211 OFF/OP EST MAY X REQ PHY/QHP: CPT | Performed by: PHYSICIAN ASSISTANT

## 2025-05-15 PROCEDURE — 73562 X-RAY EXAM OF KNEE 3: CPT | Mod: RIGHT SIDE | Performed by: RADIOLOGY

## 2025-05-15 PROCEDURE — 73562 X-RAY EXAM OF KNEE 3: CPT | Mod: RT

## 2025-05-15 NOTE — PROGRESS NOTES
KADI Dave PA-C, ATC  Adult Reconstruction and Joint Replacement Surgery  Phone: [phone]     Fax:[phone]            Chief Complaint   Patient presents with    Right Knee - Post-op       HPI:  Tye Rowley is a pleasant 59 y.o. year-old male here for follow-up of their side: right total knee arthroplasty by Dr. Lisa.  The patient is approximately 6 week(s) postop.The patient has no mechanical symptoms.  The patient has mild swelling and pain.   The patients wound has healed uneventfully.  The patient has been doing HEP and/or outpatient PT.  No complications postoperatively.      Review of Systems  Medical History[1]  Problem List[2]  Medication Documentation Review Audit       Reviewed by Erik Bunn PT (Physical Therapist) on 04/17/25 at 1236      Medication Order Taking? Sig Documenting Provider Last Dose Status   acetaminophen (Tylenol Extra Strength) 500 mg tablet [id number] Yes Take 2 tablets (1,000 mg) by mouth every 6 hours if needed for mild pain (1 - 3). Nely Hutton PA-C  Active   ascorbic acid (Vitamin C) 250 mg tablet [id number] Yes Take 1 tablet (250 mg) by mouth once daily. Historical Provider, MD Past Week Active   aspirin 81 mg EC tablet [id number] Yes Take 1 tablet (81 mg) by mouth 2 times a day. Nely Hutton PA-C  Active   chlorhexidine (Peridex) 0.12 % solution [id number] No Swish and spit 15 mL night before surgery and morning of surgery Soraida Bucio PA-C 4/2/2025 Morning Active   cholecalciferol (Vitamin D3) 25 mcg (1000 units) tablet [id number] Yes Take 1 tablet (1,000 Units) by mouth once daily. Historical Provider, MD Past Week Active   docusate sodium (Colace) 100 mg capsule [id number] Yes Take 1 capsule (100 mg) by mouth 2 times a day. Nely Hutton PA-C  Active   meloxicam (Mobic) 15 mg tablet [id number] Yes Take 1 tablet (15 mg) by mouth once daily. Nely Hutton PA-C  Active   omeprazole (PriLOSEC) 20 mg DR capsule [id number] Yes Take 1  capsule (20 mg) by mouth 2 times a day. Historical Provider, MD Past Week Active    Discontinued 04/10/25 0808   oxyCODONE (Roxicodone) 5 mg immediate release tablet [id number]  Take 1 tablet (5 mg) by mouth every 6 hours if needed for severe pain (7 - 10) for up to 7 days. Nely Hutton PA-C  Active   pantoprazole (ProtoNix) 40 mg EC tablet [id number] Yes Take 1 tablet (40 mg) by mouth once daily. Do not crush, chew, or split. Nely Hutton PA-C  Active   polyethylene glycol (Glycolax, Miralax) 17 gram packet [id number] Yes Mix 1 cap (17g) into 8 ounces of fluid and drink once daily Nely Hutton PA-C  Active   Patient taking differently:   Discontinued 04/10/25 0808   traMADol (Ultram) 50 mg tablet [id number]  Take 1 tablet (50 mg) by mouth every 6 hours if needed for severe pain (7 - 10) for up to 7 days. Nely Hutton PA-C  Active   ZINC ACETATE ORAL [id number] Yes Use 1 tablet in the mouth or throat early in the morning.. Historical Provider, MD Past Week Active                  RX Allergies[3]  Social History     Socioeconomic History    Marital status: Single     Spouse name: Not on file    Number of children: Not on file    Years of education: Not on file    Highest education level: Not on file   Occupational History    Not on file   Tobacco Use    Smoking status: Former     Types: Cigarettes    Smokeless tobacco: Never    Tobacco comments:     Quit over 15 years ago   Vaping Use    Vaping status: Never Used   Substance and Sexual Activity    Alcohol use: Yes     Alcohol/week: 10.0 standard drinks of alcohol     Types: 10 Cans of beer per week    Drug use: Not Currently    Sexual activity: Not on file   Other Topics Concern    Not on file   Social History Narrative    Not on file     Social Drivers of Health     Financial Resource Strain: Low Risk  (4/2/2025)    Overall Financial Resource Strain (CARDIA)     Difficulty of Paying Living Expenses: Not hard at all   Food Insecurity: No Food Insecurity  (4/2/2025)    Hunger Vital Sign     Worried About Running Out of Food in the Last Year: Never true     Ran Out of Food in the Last Year: Never true   Transportation Needs: No Transportation Needs (4/17/2025)    OASIS : Transportation     Lack of Transportation (Medical): No     Lack of Transportation (Non-Medical): No     Patient Unable or Declines to Respond: No   Physical Activity: Not on file   Stress: Not on file   Social Connections: Feeling Socially Integrated (4/17/2025)    OASIS : Social Isolation     Frequency of experiencing loneliness or isolation: Rarely   Intimate Partner Violence: Not At Risk (4/2/2025)    Humiliation, Afraid, Rape, and Kick questionnaire     Fear of Current or Ex-Partner: No     Emotionally Abused: No     Physically Abused: No     Sexually Abused: No   Housing Stability: Low Risk  (4/2/2025)    Housing Stability Vital Sign     Unable to Pay for Housing in the Last Year: No     Number of Times Moved in the Last Year: 0     Homeless in the Last Year: No     Surgical History[4]    Physical Exam  side: right Knee  There were no vitals filed for this visit.  AxO x 3 in NAD.   Assistive Device: no device. Coordination and balance intact.  Normal bilateral upper and lower extremities.  No erythema, ecchymosis, temperature changes. No popliteal lymphadenopathy,  No overlying lesion  Mood: euthymic  Respirations non-labored  The incision is midline healing well with no signs of surrounding infection, dehiscence or drainage.   Neurovascular exam is at baseline.  No instability varus or valgus stressing the knee at 0, 30 or 60 degrees.  No instability in the AP plane at 90 degrees.  Range of motion: 0 degrees extension, 110 degrees flexion  5/5 hip flexion/knee extension/DF/PF/EHL  SILT in zulay/saph/ per/tib distribution   Extremities warm and well perfused.  No lower extremity calf tenderness, warmth or swelling. Lower extremity well perfused  2+ Femoral/DP/PT pulses  bilaterally    Imaging:  No images are attached to the encounter.    I personally reviewed multiple views of the knee today in clinic. Status post side: right Total Knee aArthroplasty. The implant is well fixed, well aligned.  No evidence of law-implant fracture, lucency or dislocation.    Impression/Plan:  Tye Rowley is doing well post-operatively and happy with the results of the operation.     S/P side: right Total Knee Arthroplasty  I talked with patient at length about activity precautions and progression of activities. The patient understands their permanent precautions. At this time, you may gradually increase your activities and get back to a normal, low-impact lifestyle. Please avoid running, jumping, and heavy lifting.      3. Continue HEP or outpatient PT, per protocol.    4. Continue Post-operative instructions.    5. Discussed the importance of dental prophylactic dental antibiotics lifelong. Patient may request medication refill through MyChart,       Pharmacy or surgeons office.    All questions answered.    Follow-up 1 year with x-rays at next visit.    KADI Dave, PA-C, ATC  Orthopedic Physician Assisant  Adult Reconstruction and Total Joint Replacement  General Orthopedics  Department of Orthopaedic Surgery  [place] [address]  KARAL[employer] messaging preferred         [1]   Past Medical History:  Diagnosis Date    Arthritis     GERD (gastroesophageal reflux disease)     History of blood transfusion     at age 19    Hyperlipidemia    [2]   Patient Active Problem List  Diagnosis    Primary osteoarthritis of right knee   [3] No Known Allergies  [4]   Past Surgical History:  Procedure Laterality Date    COLONOSCOPY      KNEE ARTHROPLASTY Left 2017    UPPER GASTROINTESTINAL ENDOSCOPY

## 2025-05-16 ENCOUNTER — TREATMENT (OUTPATIENT)
Dept: PHYSICAL THERAPY | Facility: CLINIC | Age: 60
End: 2025-05-16
Payer: COMMERCIAL

## 2025-05-16 DIAGNOSIS — M17.11 PRIMARY OSTEOARTHRITIS OF RIGHT KNEE: ICD-10-CM

## 2025-05-16 PROCEDURE — 97140 MANUAL THERAPY 1/> REGIONS: CPT | Mod: GP | Performed by: PHYSICAL THERAPIST

## 2025-05-16 PROCEDURE — 97110 THERAPEUTIC EXERCISES: CPT | Mod: GP | Performed by: PHYSICAL THERAPIST

## 2025-05-16 NOTE — PROGRESS NOTES
Physical Therapy  Physical Therapy Orthopedic Evaluation    Patient Name: Tye Rowley  MRN: [id number]  Today's Date: 5/16/2025  Time Calculation  Start Time: 1115  Stop Time: 1158  Time Calculation (min): 43 min       PT Therapeutic Procedures Time Entry  Manual Therapy Time Entry: 8  Therapeutic Exercise Time Entry: 35       Insurance:  Visit number: 8  Authorization info: No auth  Insurance Type: Payor: ANTHEM / Plan: ANTHEM HMP / Product Type: *No Product type* /      Current Problem     Problem List Items Addressed This Visit           ICD-10-CM    Primary osteoarthritis of right knee M17.11       Surgery:4/2/25 R TKA Dr. Lisa    Referred by: Nely Hutton PA-C      Precautions:   L TKA 2017    Subjective:   Subjective   Pain is getting better, felt that he had a break through yesterday    General:     Current Condition:   Better    Pain:     Location: R knee  4/10    Relevant Information (PMH & Previous Tests/Imaging): + imaging prior to surgery    Previous Interventions/Treatments: Physical Therapy    Prior Level of Function (PLOF)  Patient previously independent with all ADLs  Exercise/Physical Activity: hiking  Work/School: -     Patients Living Environment: Reviewed and no concern  Primary Language: English  Patient's Goal(s) for Therapy: walk normal, kneeling     Objective:  Objective   L knee 40.8 cm supra, 41 cm mid  R knee 44 cm mid patella, 401 cm supra  Palpation/Observation  Mild soreness medial hamstring and anterior thigh  Posture  Mildly flexed R knee in standing  ROM  5/16/2025  L knee 0-120 AAROM  Strength  5/16/2025  3/5 knee flex/ext, hip flex  Quad set fair  Sensation  full  Reflexes    Transfers: slow, requires UE assist  Gait: slower, using Single crutch, reciprocal pattern  DL Squat: 30 sec sit to stand 7, can perform without UE assist    Treatments:  Ther-EX:  - bike, 7 min   -heel slides x 20  -swiss ball flexion 2 x15  - Red TKE 2 x10  -  "sit to stand 15 lb 1 x 10, 20 lb x 10  -single leg balance air ex pad 10 x 10\" slight knee bend      Gait mechanics   Manual:  Massage gun to it band, quad, PROM R knee flex and ext  Modalities:       PT Therapeutic Procedures Time Entry  Manual Therapy Time Entry: 8  Therapeutic Exercise Time Entry: 35       EDUCATION: Home exercise program, plan of care, activity modifications, pain management, and injury pathology     Code:  [id number]    Medical History Form: Reviewed (scanned into chart)  Reviewed medical form, [place], [place] risk, Jainism beliefs, PHQ     Screening  Frequency  Date Last Completed   Spiritual and Cultural Beliefs   Screening each visit or episode of care 4/23/2025   Falls Risk Screening every ambulatory visit    Pain Screening annually at primary care visit  8/4/2023   Domestic Violence screening annually at primary care visit    Depression Screening annually in the primary care setting 4/2/2025   Suicide Risk Screening annually in the primary care setting 4/2/2025   Nutrition and Food Insecurity   Screening at least annually at primary care visit  4/2/2025   Key Learner annually in the primary care setting 4/23/2025   Drug Screen  4/2/2025  5:54 AM   Alcohol Screen  4/2/2025  5:54 AM   Advance Directive       Time Calculation  Start Time: 1115  Stop Time: 1158  Time Calculation (min): 43 min    PT Therapeutic Procedures Time Entry  Manual Therapy Time Entry: 8  Therapeutic Exercise Time Entry: 35          Assessment: Less swelling and irritation of lateral aspect of R knee, improved strength tolerance. Progressing well with less pain.reasonable muscle compensation with single leg balance on airex secondary to decreased strength from TKA     Complexity:Low  Prognosis: good  Response to care: good  Clinical Presentation: Stable and/or uncomplicated characteristics  Personal Factors: None    Problem List:  Pain  Function  Mobility  Strength  Plan:     Planned Interventions include: " therapeutic exercise, self-care home management, manual therapy, therapeutic activities, gait training, neuromuscular coordination, vasopneumatic, dry needling, aquatic therapy    Next Treatment:progress knee ROM, shuttle press, SAQ, LAQ, emphasis on terminal extension  Frequency: 2 x Week  Duration: 8 Weeks  Goals: Set and discussed today  4 weeks  Patient to have pain less than 4/10 for improved end range flexion  Patient to be independent with HEP and progression for improved carryover  Improved ability to perform sit to stand without UE assist, 7 reps 30 sec sit to stand  Improved tolerance with knee flexion to 120deg AAROM    8 weeks  Patient to have improved LEFS score by 15 points for improved function at home  Patient to have pain less than 2/10 for improved ADL performance  Improved ability to navigate stairs reciprocally with no single UE assist for better function with daily activities  ROM 0-125 improved to for participation in outdoor activities      Plan of care was developed with input and agreement by the patient      Gaudencio Blevins, PT

## 2025-05-18 NOTE — PROGRESS NOTES
Physical Therapy  Physical Therapy Orthopedic Evaluation    Patient Name: Tye Rowley  MRN: [id number]  Today's Date: 5/19/2025  Time Calculation  Start Time: 0800  Stop Time: 0845  Time Calculation (min): 45 min       PT Therapeutic Procedures Time Entry  Manual Therapy Time Entry: 10  Therapeutic Exercise Time Entry: 30       Insurance:  Visit number: 9  Authorization info: No auth  Insurance Type: Payor: ANTHEM / Plan: ANTHEM HMP / Product Type: *No Product type* /      Current Problem     Problem List Items Addressed This Visit           ICD-10-CM    Primary osteoarthritis of right knee M17.11       Surgery:4/2/25 R TKA Dr. Lisa    Referred by: Nely Hutton PA-C      Precautions:   L TKA 2017    Subjective:   Subjective   Pain reports that he has stiffness especially in AM and occasional pain when he twists or rotates knee.    General:     Current Condition:   Better    Pain:     Location: R knee  2/10    Relevant Information (PMH & Previous Tests/Imaging): + imaging prior to surgery    Previous Interventions/Treatments: Physical Therapy    Prior Level of Function (PLOF)  Patient previously independent with all ADLs  Exercise/Physical Activity: hiking  Work/School: -     Patients Living Environment: Reviewed and no concern  Primary Language: English  Patient's Goal(s) for Therapy: walk normal, kneeling     Objective:  Objective   L knee 40.8 cm supra, 41 cm mid  R knee 44 cm mid patella, 401 cm supra  Palpation/Observation  Mild soreness medial hamstring and anterior thigh  Posture  Mildly flexed R knee in standing  ROM  5/19/25  L knee 0-120 AAROM  Strength  5/19/25  3/5 knee flex/ext, hip flex  Quad set fair  Sensation  full  Reflexes    Transfers: slow, requires UE assist  Gait: slower, using Single crutch, reciprocal pattern  DL Squat: 30 sec sit to stand 7, can perform without UE assist    Treatments:  Ther-EX:  - bike, 7 min   - shuttle press 50#   3 x 10  "  - 6\" step up with high knee 2 x 10   - 6\" lateral step up  2 x 10   - SL abd on airex 2 x 10 ( finer touch for balance)  -heel slides  10 x 10\"   -LAQ 4#  2 x 10   - sit to stand 2 x 10  20#  (L foot slightly forward)  20\" height    Manual:   STM with rollerstick to IT band, quad,   PROM R knee flex and ext  Modalities:       PT Therapeutic Procedures Time Entry  Manual Therapy Time Entry: 10  Therapeutic Exercise Time Entry: 30       EDUCATION: Home exercise program, plan of care, activity modifications, pain management, and injury pathology     Code:  [id number]    Medical History Form: Reviewed (scanned into chart)  Reviewed medical form, [place], [place] risk, Synagogue beliefs, PHQ     Screening  Frequency  Date Last Completed   Spiritual and Cultural Beliefs   Screening each visit or episode of care 4/23/2025   Falls Risk Screening every ambulatory visit    Pain Screening annually at primary care visit  8/4/2023   Domestic Violence screening annually at primary care visit    Depression Screening annually in the primary care setting 4/2/2025   Suicide Risk Screening annually in the primary care setting 4/2/2025   Nutrition and Food Insecurity   Screening at least annually at primary care visit  4/2/2025   [place] annually in the primary care setting 4/23/2025   Drug Screen  4/2/2025  5:54 AM   Alcohol Screen  4/2/2025  5:54 AM   Advance Directive       Time Calculation  Start Time: 0800  Stop Time: 0845  Time Calculation (min): 45 min    PT Therapeutic Procedures Time Entry  Manual Therapy Time Entry: 10  Therapeutic Exercise Time Entry: 30          Assessment:   Patient tolerated session well.  Was able to increase resistance with many exercises without increased knee pain.  He did need fingertip assist with balance  with standing abd on pad.  Will benefit from continued strength and feedback in mirror for gait.  Complexity:Low  Prognosis: good  Response to care: good  Clinical Presentation: Stable " and/or uncomplicated characteristics  Personal Factors: None    Problem List:  Pain  Function  Mobility  Strength  Plan:   Small guy step over, gait training in mirror  Planned Interventions include: therapeutic exercise, self-care home management, manual therapy, therapeutic activities, gait training, neuromuscular coordination, vasopneumatic, dry needling, aquatic therapy    Frequency: 2 x Week  Duration: 8 Weeks  Goals: Set and discussed today  4 weeks  Patient to have pain less than 4/10 for improved end range flexion  Patient to be independent with HEP and progression for improved carryover  Improved ability to perform sit to stand without UE assist, 7 reps 30 sec sit to stand  Improved tolerance with knee flexion to 120deg AAROM    8 weeks  Patient to have improved LEFS score by 15 points for improved function at home  Patient to have pain less than 2/10 for improved ADL performance  Improved ability to navigate stairs reciprocally with no single UE assist for better function with daily activities  ROM 0-125 improved to for participation in outdoor activities      Plan of care was developed with input and agreement by the patient      Tania Carrasco PT

## 2025-05-19 ENCOUNTER — TREATMENT (OUTPATIENT)
Dept: PHYSICAL THERAPY | Facility: CLINIC | Age: 60
End: 2025-05-19
Payer: COMMERCIAL

## 2025-05-19 DIAGNOSIS — M17.11 PRIMARY OSTEOARTHRITIS OF RIGHT KNEE: ICD-10-CM

## 2025-05-19 PROCEDURE — 97140 MANUAL THERAPY 1/> REGIONS: CPT | Mod: GP,CQ

## 2025-05-19 PROCEDURE — 97110 THERAPEUTIC EXERCISES: CPT | Mod: GP,CQ

## 2025-05-21 ENCOUNTER — TREATMENT (OUTPATIENT)
Dept: PHYSICAL THERAPY | Facility: CLINIC | Age: 60
End: 2025-05-21
Payer: COMMERCIAL

## 2025-05-21 DIAGNOSIS — M17.11 PRIMARY OSTEOARTHRITIS OF RIGHT KNEE: ICD-10-CM

## 2025-05-21 PROCEDURE — 97140 MANUAL THERAPY 1/> REGIONS: CPT | Mod: GP,CQ

## 2025-05-21 PROCEDURE — 97110 THERAPEUTIC EXERCISES: CPT | Mod: GP,CQ

## 2025-05-21 NOTE — PROGRESS NOTES
"  Physical Therapy  Physical Therapy Orthopedic Evaluation    Patient Name: Tye Rowley  MRN: [id number]  Today's Date: 5/21/2025  Time Calculation  Start Time: 0745  Stop Time: 0830  Time Calculation (min): 45 min       PT Therapeutic Procedures Time Entry  Manual Therapy Time Entry: 10  Therapeutic Exercise Time Entry: 29       Insurance:  Visit number: 10  Authorization info: No auth  Insurance Type: Payor: ANTHEM / Plan: ANTHEM HMP / Product Type: *No Product type* /      Current Problem     Problem List Items Addressed This Visit           ICD-10-CM    Primary osteoarthritis of right knee M17.11       Surgery:4/2/25 R TKA Dr. Lisa    Referred by: Nely Hutton PA-C      Precautions:   L TKA 2017    Subjective:   Subjective   Pain reports that his knee is \"okay, just tight\" today, patient had soreness in hamstrings after last treatment session, but reports no soreness coming into treatment session today.     General:     Current Condition:   Better    Pain:     Location: R knee  2/10    Relevant Information (PMH & Previous Tests/Imaging): + imaging prior to surgery    Previous Interventions/Treatments: Physical Therapy    Prior Level of Function (PLOF)  Patient previously independent with all ADLs  Exercise/Physical Activity: hiking  Work/School: -     Patients Living Environment: Reviewed and no concern  Primary Language: English  Patient's Goal(s) for Therapy: walk normal, kneeling     Objective:  Objective   L knee 40.8 cm supra, 41 cm mid  R knee 44 cm mid patella, 401 cm supra  Palpation/Observation  Mild soreness medial hamstring and anterior thigh  Posture  Mildly flexed R knee in standing  ROM  5/19/25  L knee 0-120 AAROM    5/21/ 2025    L Knee 0-122 AAROM   Strength  5/19/25  3/5 knee flex/ext, hip flex  Quad set fair  Sensation  full  Reflexes    Transfers: slow, requires UE assist  Gait: slower, using Single crutch, reciprocal pattern  DL Squat: 30 sec " "sit to stand 7, can perform without UE assist    Treatments:  Ther-EX:  - bike, 6 min   - shuttle press 62#  2x15  - Shuttle press  SL 32# 2x10  - 6\" step up with high knee 2 x 12  -  4\" lateral touchdowns 2x10  -Step over with LLE small guy steps   - abd sliders 3 x 10   - Heel slides 3x10 5second hold at flexion   -LAQ 3x10 w/ 4#     Manual:   STM with rollerstick to IT band, quad    Modalities:       PT Therapeutic Procedures Time Entry  Manual Therapy Time Entry: 10  Therapeutic Exercise Time Entry: 29       EDUCATION: Home exercise program, plan of care, activity modifications, pain management, and injury pathology. Education on Lateral step ups at home.      Code:  [id number]    Medical History Form: Reviewed (scanned into chart)  Reviewed medical form, [place], Falls risk, Jewish beliefs, PHQ     Screening  Frequency  Date Last Completed   Spiritual and Cultural Beliefs   Screening each visit or episode of care 4/23/2025   Falls Risk Screening every ambulatory visit    Pain Screening annually at primary care visit  8/4/2023   Domestic Violence screening annually at primary care visit    Depression Screening annually in the primary care setting 4/2/2025   Suicide Risk Screening annually in the primary care setting 4/2/2025   Nutrition and Food Insecurity   Screening at least annually at primary care visit  4/2/2025   Key Learner annually in the primary care setting 4/23/2025   Drug Screen  4/2/2025  5:54 AM   Alcohol Screen  4/2/2025  5:54 AM   Advance Directive       Time Calculation  Start Time: 0745  Stop Time: 0830  Time Calculation (min): 45 min    PT Therapeutic Procedures Time Entry  Manual Therapy Time Entry: 10  Therapeutic Exercise Time Entry: 29          Assessment:   Patient tolerated session well.  Would benefit by additional use of steps over small hurdles with LLE to decrease circumduction of RLE,  Patient had increased fatigue with eccentric strengthening during step downs while WB on " RLE. During this exercise, patient had increased valgus collapse of RLE, corrected by v/c indicating possible need for glut strengthening. Patient responded well to SL on Shuffle recovery with appropriate fatigue.      Complexity:Low  Prognosis: good  Response to care: good  Clinical Presentation: Stable and/or uncomplicated characteristics  Personal Factors: None    Problem List:  Pain  Function  Mobility  Strength    Plan:  Lateral step with resistive band   Planned Interventions include: therapeutic exercise, self-care home management, manual therapy, therapeutic activities, gait training, neuromuscular coordination, vasopneumatic, dry needling, aquatic therapy    Frequency: 2 x Week  Duration: 8 Weeks  Goals: Set and discussed today  4 weeks  Patient to have pain less than 4/10 for improved end range flexion  Patient to be independent with HEP and progression for improved carryover  Improved ability to perform sit to stand without UE assist, 7 reps 30 sec sit to stand  Improved tolerance with knee flexion to 120deg AAROM    8 weeks  Patient to have improved LEFS score by 15 points for improved function at home  Patient to have pain less than 2/10 for improved ADL performance  Improved ability to navigate stairs reciprocally with no single UE assist for better function with daily activities  ROM 0-125 improved to for participation in outdoor activities      Plan of care was developed with input and agreement by the patient        Tania Carrasco PT

## 2025-05-27 ENCOUNTER — TREATMENT (OUTPATIENT)
Dept: PHYSICAL THERAPY | Facility: CLINIC | Age: 60
End: 2025-05-27
Payer: COMMERCIAL

## 2025-05-27 DIAGNOSIS — M17.11 PRIMARY OSTEOARTHRITIS OF RIGHT KNEE: ICD-10-CM

## 2025-05-27 PROCEDURE — 97140 MANUAL THERAPY 1/> REGIONS: CPT | Mod: GP,CQ

## 2025-05-27 PROCEDURE — 97110 THERAPEUTIC EXERCISES: CPT | Mod: GP,CQ

## 2025-05-27 NOTE — PROGRESS NOTES
"  Physical Therapy  Physical Therapy Orthopedic Evaluation    Patient Name: Tye Rowley  MRN: [id number]  Today's Date: 5/27/2025  Time Calculation  Start Time: 0830  Stop Time: 0915  Time Calculation (min): 45 min       PT Therapeutic Procedures Time Entry  Manual Therapy Time Entry: 10  Therapeutic Exercise Time Entry: 30       Insurance:  Visit number: 11  Authorization info: No auth  Insurance Type: Payor: ANTHEM / Plan: ANTHEM HMP / Product Type: *No Product type* /      Current Problem     Problem List Items Addressed This Visit           ICD-10-CM    Primary osteoarthritis of right knee M17.11       Surgery:4/2/25 R TKA Dr. Lisa    Referred by: Nely Hutton PA-C      Precautions:   L TKA 2017    Subjective:   Subjective   Pain reports that his knee feels \"pretty good/ about the same\" today. Patient reports he has trouble sleeping at night due to pain, no position seems to decrease pain, is not taking pain meds at this time to sleep. Patient stated that knee had increased edema on this Sunday, no apparent reason for increase swelling.     General:     Current Condition:   Better    Pain:  Location: R knee  2/10    Relevant Information (PMH & Previous Tests/Imaging): + imaging prior to surgery    Previous Interventions/Treatments: Physical Therapy    Prior Level of Function (PLOF)  Patient previously independent with all ADLs  Exercise/Physical Activity: hiking  Work/School: -     Patients Living Environment: Reviewed and no concern  Primary Language: English  Patient's Goal(s) for Therapy: walk normal, kneeling     Objective:  Objective   L knee 40.8 cm supra, 41 cm mid  R knee 44 cm mid patella, 401 cm supra  Palpation/Observation  Mild soreness medial hamstring and anterior thigh  Posture  Mildly flexed R knee in standing  ROM  5/19/25  L knee 0-120 AAROM    5/21/ 2025    L Knee 0-122 AAROM   Strength  5/19/25  3/5 knee flex/ext, hip flex  Quad set " "fair  Sensation  full  Reflexes    Transfers: slow, requires UE assist  Gait: slower, using Single crutch, reciprocal pattern  DL Squat: 30 sec sit to stand 7, can perform without UE assist    Treatments:  Ther-EX:  - bike, 6 min   - shuttle press 62#  2x15  - Shuttle press  SL 37# 2x10  - 6\" lateral touch downs 2x10  -[id number] lateral walk with GTB   - Step over with LLE small guy steps   - LAQ 3x10 w/ 5#  - STS with no UE 3x10 with LLE slightly forward     Manual:   STM with rollerstick to IT band, quad    Modalities:       PT Therapeutic Procedures Time Entry  Manual Therapy Time Entry: 10  Therapeutic Exercise Time Entry: 30       EDUCATION: Home exercise program, plan of care, activity modifications, pain management, and injury pathology. Education on Lateral step ups at home, extension stretch, and flexion      Code:  [id number]    Medical History Form: Reviewed (scanned into chart)  Reviewed medical form, [place], Falls risk, Latter-day beliefs, PHQ     Screening  Frequency  Date Last Completed   Spiritual and Cultural Beliefs   Screening each visit or episode of care 4/23/2025   Falls Risk Screening every ambulatory visit    Pain Screening annually at primary care visit  8/4/2023   Domestic Violence screening annually at primary care visit    Depression Screening annually in the primary care setting 4/2/2025   Suicide Risk Screening annually in the primary care setting 4/2/2025   Nutrition and Food Insecurity   Screening at least annually at primary care visit  4/2/2025   Key Learner annually in the primary care setting 4/23/2025   Drug Screen  4/2/2025  5:54 AM   Alcohol Screen  4/2/2025  5:54 AM   Advance Directive       Time Calculation  Start Time: 0830  Stop Time: 0915  Time Calculation (min): 45 min    PT Therapeutic Procedures Time Entry  Manual Therapy Time Entry: 10  Therapeutic Exercise Time Entry: 30          Assessment:   Patient tolerated treatment session well, had appropriate levels of " "fatigue in response to tx. Patient had decreased circumduction of RLE while doing small guy steps. Patient was able to progress to 6\" lateral step downs. Patient had good response to glute strengthening lateral walks, demonstrates continued need for glue strengthening to help decrease valgus collapse during SL step downs and STS.     Complexity:Low  Prognosis: good  Response to care: good  Clinical Presentation: Stable and/or uncomplicated characteristics  Personal Factors: None    Problem List:  Pain  Function  Mobility  Strength    Plan:  Continue glute strengthening exercises.   Planned Interventions include: therapeutic exercise, self-care home management, manual therapy, therapeutic activities, gait training, neuromuscular coordination, vasopneumatic, dry needling, aquatic therapy    Frequency: 2 x Week  Duration: 8 Weeks  Goals: Set and discussed today  4 weeks  Patient to have pain less than 4/10 for improved end range flexion  Patient to be independent with HEP and progression for improved carryover  Improved ability to perform sit to stand without UE assist, 7 reps 30 sec sit to stand  Improved tolerance with knee flexion to 120deg AAROM    8 weeks  Patient to have improved LEFS score by 15 points for improved function at home  Patient to have pain less than 2/10 for improved ADL performance  Improved ability to navigate stairs reciprocally with no single UE assist for better function with daily activities  ROM 0-125 improved to for participation in outdoor activities      Plan of care was developed with input and agreement by the patient        Tania Carrasco PT   "

## 2025-05-28 NOTE — PROGRESS NOTES
"  Physical Therapy  Physical Therapy Orthopedic Evaluation    Patient Name: Tye Rowley  MRN: [id number]  Today's Date: 5/29/2025  Time Calculation  Start Time: 0830  Stop Time: 0915  Time Calculation (min): 45 min       PT Therapeutic Procedures Time Entry  Manual Therapy Time Entry: 10  Therapeutic Exercise Time Entry: 30       Insurance:  Visit number: 12  Authorization info: No auth  Insurance Type: Payor: ANTHEM / Plan: ANTHEM HMP / Product Type: *No Product type* /      Current Problem     Problem List Items Addressed This Visit           ICD-10-CM    Primary osteoarthritis of right knee M17.11       Surgery:4/2/25 R TKA Dr. Lisa    Referred by: Nely Hutton PA-C      Precautions:   L TKA 2017    Subjective:   Subjective   Pain reports that his knee feels \"good\" today. Patient was sore in glut, hamstring, and quad following last tx session, pt is not sore today upon arrival. Patient is having increase in anterior lateral knee pain (sharp) in flexion during walking. Patient is still having pain when sleeping, waking him up 2-3 times per night, aching.     General:     Current Condition:   Better    Pain:  Location: R knee  2/10    Relevant Information (PMH & Previous Tests/Imaging): + imaging prior to surgery    Previous Interventions/Treatments: Physical Therapy    Prior Level of Function (PLOF)  Patient previously independent with all ADLs  Exercise/Physical Activity: hiking  Work/School: -     Patients Living Environment: Reviewed and no concern  Primary Language: English  Patient's Goal(s) for Therapy: walk normal, kneeling     Objective:  Objective   L knee 40.8 cm supra, 41 cm mid  R knee 44 cm mid patella, 401 cm supra  Palpation/Observation  Mild soreness medial hamstring and anterior thigh  Posture  Mildly flexed R knee in standing  ROM  5/19/25  L knee 0-120 AAROM    5/21/ 2025    L Knee 0-122 AAROM   Strength  5/19/25  3/5 knee flex/ext, hip flex  Quad " "set fair  Sensation  full  Reflexes    Transfers: slow, requires UE assist  Gait: slower, using Single crutch, reciprocal pattern  DL Squat: 30 sec sit to stand 7, can perform without UE assist    Treatments:  Ther-EX:  - bike, 6 min   - shuttle press 62#  2x15  - Shuttle press  SL 41# 2x10  - Standing hip 3 way with slider 3x5(each direction)   -Lateral walks [id number]  with GTB  - 6\" forward tap 2x10  - LAQ 3x10 w/ 5#        Manual:  STM with rollerstick to IT band, quad  STM R knee, quad   Manual HS stretch     Modalities:       PT Therapeutic Procedures Time Entry  Manual Therapy Time Entry: 10  Therapeutic Exercise Time Entry: 30       EDUCATION: Home exercise program, plan of care, activity modifications, pain management, and injury pathology. Education on Lateral step ups at home, extension stretch, and flexion      Code:  [id number]    Medical History Form: Reviewed (scanned into chart)  Reviewed medical form, [place], Falls risk, Temple beliefs, PHQ     Screening  Frequency  Date Last Completed   Spiritual and Cultural Beliefs   Screening each visit or episode of care 4/23/2025   Falls Risk Screening every ambulatory visit    Pain Screening annually at primary care visit  8/4/2023   Domestic Violence screening annually at primary care visit    Depression Screening annually in the primary care setting 4/2/2025   Suicide Risk Screening annually in the primary care setting 4/2/2025   Nutrition and Food Insecurity   Screening at least annually at primary care visit  4/2/2025   Key Learner annually in the primary care setting 4/23/2025   Drug Screen  4/2/2025  5:54 AM   Alcohol Screen  4/2/2025  5:54 AM   Advance Directive       Time Calculation  Start Time: 0830  Stop Time: 0915  Time Calculation (min): 45 min    PT Therapeutic Procedures Time Entry  Manual Therapy Time Entry: 10  Therapeutic Exercise Time Entry: 30          Assessment:   Patient responded well to tx session, patient had appropriate " fatigue with step downs and standing 3 way hip slides. Patient had increased knee extension during LAQ in comparison to last session, patient explained he is doing extension stretch at home with weights. Patient had decrease in pain while walking after tx session. Patient had tenderness to palpation on lateral aspect of R knee. Shows continued need for working on active knee extension and glut strengthening.     Complexity:Low  Prognosis: good  Response to care: good  Clinical Presentation: Stable and/or uncomplicated characteristics  Personal Factors: None    Problem List:  Pain  Function  Mobility  Strength    Plan:  Continue glute strengthening exercises and controled decent while on stairs.   Planned Interventions include: therapeutic exercise, self-care home management, manual therapy, therapeutic activities, gait training, neuromuscular coordination, vasopneumatic, dry needling, aquatic therapy    Frequency: 2 x Week  Duration: 8 Weeks  Goals: Set and discussed today  4 weeks  Patient to have pain less than 4/10 for improved end range flexion  Patient to be independent with HEP and progression for improved carryover  Improved ability to perform sit to stand without UE assist, 7 reps 30 sec sit to stand  Improved tolerance with knee flexion to 120deg AAROM    8 weeks  Patient to have improved LEFS score by 15 points for improved function at home  Patient to have pain less than 2/10 for improved ADL performance  Improved ability to navigate stairs reciprocally with no single UE assist for better function with daily activities  ROM 0-125 improved to for participation in outdoor activities      Plan of care was developed with input and agreement by the patient        Tania Carrasco PTA

## 2025-05-29 ENCOUNTER — TREATMENT (OUTPATIENT)
Dept: PHYSICAL THERAPY | Facility: CLINIC | Age: 60
End: 2025-05-29
Payer: COMMERCIAL

## 2025-05-29 DIAGNOSIS — M17.11 PRIMARY OSTEOARTHRITIS OF RIGHT KNEE: ICD-10-CM

## 2025-05-29 PROCEDURE — 97140 MANUAL THERAPY 1/> REGIONS: CPT | Mod: GP,CQ

## 2025-05-29 PROCEDURE — 97110 THERAPEUTIC EXERCISES: CPT | Mod: GP,CQ

## 2025-06-05 ENCOUNTER — TREATMENT (OUTPATIENT)
Dept: PHYSICAL THERAPY | Facility: CLINIC | Age: 60
End: 2025-06-05
Payer: COMMERCIAL

## 2025-06-05 DIAGNOSIS — M17.11 PRIMARY OSTEOARTHRITIS OF RIGHT KNEE: ICD-10-CM

## 2025-06-05 PROCEDURE — 97140 MANUAL THERAPY 1/> REGIONS: CPT | Mod: GP,CQ

## 2025-06-05 PROCEDURE — 97110 THERAPEUTIC EXERCISES: CPT | Mod: GP,CQ

## 2025-06-05 NOTE — PROGRESS NOTES
"  Physical Therapy  Physical Therapy Orthopedic Evaluation    Patient Name: Tye Rowley  MRN: [id number]  Today's Date: 6/5/2025  Time Calculation  Start Time: 0830  Stop Time: 0915  Time Calculation (min): 45 min       PT Therapeutic Procedures Time Entry  Manual Therapy Time Entry: 15  Therapeutic Exercise Time Entry: 23       Insurance:  Visit number: 13  Authorization info: No auth  Insurance Type: Payor: ANTHEM / Plan: ANTHEM HMP / Product Type: *No Product type* /      Current Problem     Problem List Items Addressed This Visit           ICD-10-CM    Primary osteoarthritis of right knee M17.11       Surgery:4/2/25 R TKA Dr. Lisa    Referred by: Nely Hutton PA-C      Precautions:   L TKA 2017    Subjective:   Subjective   Pain reports that his knee feels \"about the same\". Feels that L knee is taking longer to get \"back to normal\" compared to when his L knee was replaced about 9 years ago. Patient had min soreness following last tx session. Patient reports sometimes when waking up after sleeping, knee feels very tight and then begins to spasm, no apparent reason.    General:     Current Condition:   Better    Pain:  Location: R knee  2/10    Relevant Information (PMH & Previous Tests/Imaging): + imaging prior to surgery    Previous Interventions/Treatments: Physical Therapy    Prior Level of Function (PLOF)  Patient previously independent with all ADLs  Exercise/Physical Activity: hiking  Work/School: -     Patients Living Environment: Reviewed and no concern  Primary Language: English  Patient's Goal(s) for Therapy: walk normal, kneeling     Objective:  Objective   L knee 40.8 cm supra, 41 cm mid  R knee 44 cm mid patella, 401 cm supra  Palpation/Observation  Mild soreness medial hamstring and anterior thigh  Posture  Mildly flexed R knee in standing  ROM  5/19/25  L knee 0-120 AAROM    5/21/ 2025    L Knee 0-122 AAROM   Strength  5/19/25  3/5 knee flex/ext, hip " "flex  Quad set fair  Sensation  full  Reflexes    Transfers: slow, requires UE assist  Gait: slower, using Single crutch, reciprocal pattern  DL Squat: 30 sec sit to stand 7, can perform without UE assist    Treatments:  Ther-EX:  - bike, 6 min level 2.5  -Shuttle press 62# 2x15  -Shuttle press SL 41# 2x12  -// bars half kneeling to stand 4\"box with airex 2x8B   -6\" step ups in // bars with 10# KB 2x10  -4\" Forward tap in // bars 3x10        Manual:  STM with rollerstick to IT band, quad  STM R knee, quad     Modalities:       PT Therapeutic Procedures Time Entry  Manual Therapy Time Entry: 15  Therapeutic Exercise Time Entry: 23       EDUCATION: Home exercise program, plan of care, activity modifications, pain management, and injury pathology. Education on Lateral step ups at home, extension stretch, and flexion      Code:  [id number]    Medical History Form: Reviewed (scanned into chart)  Reviewed medical form, [place], Falls risk, [employer]aritan beliefs, PHQ     Screening  Frequency  Date Last Completed   Spiritual and Cultural Beliefs   Screening each visit or episode of care 4/23/2025   Falls Risk Screening every ambulatory visit    Pain Screening annually at primary care visit  8/4/2023   Domestic Violence screening annually at primary care visit    Depression Screening annually in the primary care setting 4/2/2025   Suicide Risk Screening annually in the primary care setting 4/2/2025   Nutrition and Food Insecurity   Screening at least annually at primary care visit  4/2/2025   Key Learner annually in the primary care setting 4/23/2025   Drug Screen  4/2/2025  5:54 AM   Alcohol Screen  4/2/2025  5:54 AM   Advance Directive       Time Calculation  Start Time: 0830  Stop Time: 0915  Time Calculation (min): 45 min    PT Therapeutic Procedures Time Entry  Manual Therapy Time Entry: 15  Therapeutic Exercise Time Entry: 23          Assessment:   Patient responded well to tx session, patient had appropriate fatigue " with step downs and half kneel exercise. Continue to strengthen LE and work on eccentric control while descending stairs. Patient ITB was tight, decreased with STM. Patient advised to continue with HEP and stretching to increase ROM.  Also advised to placed waterproof bandage over small area of incision while in pool during vacation even though doctors office gave approval to swim due to small area that looks like it may not be fully healed.    Complexity:Low  Prognosis: good  Response to care: good  Clinical Presentation: Stable and/or uncomplicated characteristics  Personal Factors: None    Problem List:  Pain  Function  Mobility  Strength    Plan:  Continue glute strengthening exercises and controlled decent while on stairs.   Planned Interventions include: therapeutic exercise, self-care home management, manual therapy, therapeutic activities, gait training, neuromuscular coordination, vasopneumatic, dry needling, aquatic therapy    Frequency: 2 x Week  Duration: 8 Weeks  Goals: Set and discussed today  4 weeks  Patient to have pain less than 4/10 for improved end range flexion  Patient to be independent with HEP and progression for improved carryover  Improved ability to perform sit to stand without UE assist, 7 reps 30 sec sit to stand  Improved tolerance with knee flexion to 120deg AAROM    8 weeks  Patient to have improved LEFS score by 15 points for improved function at home  Patient to have pain less than 2/10 for improved ADL performance  Improved ability to navigate stairs reciprocally with no single UE assist for better function with daily activities  ROM 0-125 improved to for participation in outdoor activities      Plan of care was developed with input and agreement by the patient        Tania Carrasco PTA

## 2025-06-06 ENCOUNTER — APPOINTMENT (OUTPATIENT)
Dept: PHYSICAL THERAPY | Facility: CLINIC | Age: 60
End: 2025-06-06
Payer: COMMERCIAL

## 2025-06-06 DIAGNOSIS — M17.11 PRIMARY OSTEOARTHRITIS OF RIGHT KNEE: ICD-10-CM

## 2025-06-10 ENCOUNTER — TREATMENT (OUTPATIENT)
Dept: PHYSICAL THERAPY | Facility: CLINIC | Age: 60
End: 2025-06-10
Payer: COMMERCIAL

## 2025-06-10 DIAGNOSIS — M17.11 PRIMARY OSTEOARTHRITIS OF RIGHT KNEE: ICD-10-CM

## 2025-06-10 PROCEDURE — 97110 THERAPEUTIC EXERCISES: CPT | Mod: GP | Performed by: PHYSICAL THERAPIST

## 2025-06-10 PROCEDURE — 97140 MANUAL THERAPY 1/> REGIONS: CPT | Mod: GP | Performed by: PHYSICAL THERAPIST

## 2025-06-10 NOTE — PROGRESS NOTES
Physical Therapy  Physical Therapy Orthopedic Evaluation    Patient Name: Tye Rowley  MRN: [id number]  Today's Date: 6/10/2025  Time Calculation  Start Time: 0830  Stop Time: 0915  Time Calculation (min): 45 min       PT Therapeutic Procedures Time Entry  Manual Therapy Time Entry: 20  Therapeutic Exercise Time Entry: 25       Insurance:  Visit number: 14  Authorization info: No auth  Insurance Type: Payor: ANTHEM / Plan: ANTHEM HMP / Product Type: *No Product type* /      Current Problem     Problem List Items Addressed This Visit           ICD-10-CM       Musculoskeletal and Injuries    Primary osteoarthritis of right knee M17.11       Surgery:4/2/25 R TKA Dr. Lisa    Referred by: Nely Hutton PA-C      Precautions:   L TKA 2017    Subjective:   Subjective   Patient feel good about knee just when he does tasks sometime he get a shock of pain.     General:     Current Condition:   Better    Pain:  Location: R knee  2/10    Relevant Information (PMH & Previous Tests/Imaging): + imaging prior to surgery    Previous Interventions/Treatments: Physical Therapy    Prior Level of Function (PLOF)  Patient previously independent with all ADLs  Exercise/Physical Activity: hiking  Work/School: -     Patients Living Environment: Reviewed and no concern  Primary Language: English  Patient's Goal(s) for Therapy: walk normal, kneeling     Objective:  Objective   L knee 40.8 cm supra, 41 cm mid  R knee 44 cm mid patella, 401 cm supra  Palpation/Observation  Mild soreness medial hamstring and anterior thigh  Posture  Mildly flexed R knee in standing  ROM  5/19/25  L knee 0-120 AAROM    5/21/ 2025    L Knee 0-122 AAROM   Strength  5/19/25  3/5 knee flex/ext, hip flex  Quad set fair  Sensation  full  Reflexes    Transfers: slow, requires UE assist  Gait: slower, using Single crutch, reciprocal pattern  DL Squat: 30 sec sit to stand 7, can perform without UE  assist    Treatments:  Ther-EX:  - bike, 6 min level 2.5  - Seated kettle bell 20# squats with box and foam pad 2 x 12  -static reverse lunges  2 x15  -slider lunges lateral and reverse 2 x 15          Manual:      Modalities:       PT Therapeutic Procedures Time Entry  Manual Therapy Time Entry: 20  Therapeutic Exercise Time Entry: 25       EDUCATION: Home exercise program, plan of care, activity modifications, pain management, and injury pathology. Education on Lateral step ups at home, extension stretch, and flexion      Code:  [id number]    Medical History Form: Reviewed (scanned into chart)  Reviewed medical form, Key [place], Falls risk, Jehovah's witness beliefs, PHQ     Screening  Frequency  Date Last Completed   Spiritual and Cultural Beliefs   Screening each visit or episode of care 4/23/2025   Falls Risk Screening every ambulatory visit    Pain Screening annually at primary care visit  8/4/2023   Domestic Violence screening annually at primary care visit    Depression Screening annually in the primary care setting 4/2/2025   Suicide Risk Screening annually in the primary care setting 4/2/2025   Nutrition and Food Insecurity   Screening at least annually at primary care visit  4/2/2025   Key Learner annually in the primary care setting 4/23/2025   Drug Screen  4/2/2025  5:54 AM   Alcohol Screen  4/2/2025  5:54 AM   Advance Directive       Time Calculation  Start Time: 0830  Stop Time: 0915  Time Calculation (min): 45 min    PT Therapeutic Procedures Time Entry  Manual Therapy Time Entry: 20  Therapeutic Exercise Time Entry: 25          Assessment:   He has demonstrated readiness for closed chain exercises to promote functional strength and neuromuscular control. During seated kettlebell squats, the patient is able to complete the movement but requires moderate verbal and tactile cueing to maintain proper alignment and avoid compensatory patterns, primarily due to observed quadriceps weakness on the left side. Form  deviations include reduced control during the eccentric phase and knee valgus tendency. During slider lunges, the patient exhibits postural compensations, including anterior trunk lean and inadequate knee tracking over the foot. He responds to cueing but requires continued focus on posture and controlled movement to prevent strain and reinforce proper mechanics.      Complexity:Low  Prognosis: good  Response to care: good  Clinical Presentation: Stable and/or uncomplicated characteristics  Personal Factors: None    Problem List:  Pain  Function  Mobility  Strength    Plan:  Continue glute strengthening exercises and controlled decent while on stairs.   Planned Interventions include: therapeutic exercise, self-care home management, manual therapy, therapeutic activities, gait training, neuromuscular coordination, vasopneumatic, dry needling, aquatic therapy    Next visit  focus on close chain position, strengthening, and stretching exercises.    Frequency: 2 x Week  Duration: 8 Weeks  Goals: Set and discussed today  4 weeks  Patient to have pain less than 4/10 for improved end range flexion  Patient to be independent with HEP and progression for improved carryover  Improved ability to perform sit to stand without UE assist, 7 reps 30 sec sit to stand  Improved tolerance with knee flexion to 120deg AAROM    8 weeks  Patient to have improved LEFS score by 15 points for improved function at home  Patient to have pain less than 2/10 for improved ADL performance  Improved ability to navigate stairs reciprocally with no single UE assist for better function with daily activities  ROM 0-125 improved to for participation in outdoor activities      Plan of care was developed with input and agreement by the patient        GAUTAM WALTERS

## 2025-06-13 ENCOUNTER — TREATMENT (OUTPATIENT)
Dept: PHYSICAL THERAPY | Facility: CLINIC | Age: 60
End: 2025-06-13
Payer: COMMERCIAL

## 2025-06-13 DIAGNOSIS — M17.11 PRIMARY OSTEOARTHRITIS OF RIGHT KNEE: ICD-10-CM

## 2025-06-13 PROCEDURE — 97110 THERAPEUTIC EXERCISES: CPT | Mod: GP | Performed by: PHYSICAL THERAPIST

## 2025-06-13 PROCEDURE — 97140 MANUAL THERAPY 1/> REGIONS: CPT | Mod: GP | Performed by: PHYSICAL THERAPIST

## 2025-06-13 NOTE — PROGRESS NOTES
Physical Therapy  Physical Therapy Orthopedic Evaluation    Patient Name: Tye Rowley  MRN: [id number]  Today's Date: 6/13/2025  Time Calculation  Start Time: 0830  Stop Time: 0910  Time Calculation (min): 40 min       PT Therapeutic Procedures Time Entry  Therapeutic Exercise Time Entry: 35  Manual Therapy Time Entry: 10       Insurance:  Visit number: 15  Authorization info: No auth  Insurance Type: Payor: ANTHEM / Plan: ANTHEM HMP / Product Type: *No Product type* /      Current Problem     Problem List Items Addressed This Visit           ICD-10-CM    Primary osteoarthritis of right knee M17.11       Surgery:4/2/25 R TKA Dr. Lisa    Referred by: Nely Hutton PA-C      Precautions:   L TKA 2017    Subjective:   Subjective   Patient feel good after strengthening, hamstring and quad soreness post strengthening     General:     Current Condition:   Better    Pain:  Location: R knee  2/10    Relevant Information (PMH & Previous Tests/Imaging): + imaging prior to surgery    Previous Interventions/Treatments: Physical Therapy    Prior Level of Function (PLOF)  Patient previously independent with all ADLs  Exercise/Physical Activity: hiking  Work/School: -     Patients Living Environment: Reviewed and no concern  Primary Language: English  Patient's Goal(s) for Therapy: walk normal, kneeling     Objective:  Objective   L knee 40.8 cm supra, 41 cm mid  R knee 44 cm mid patella, 401 cm supra  Palpation/Observation  Mild soreness medial hamstring and anterior thigh  Posture  Mildly flexed R knee in standing  ROM  5/19/25  L knee 0-120 AAROM    5/21/ 2025    L Knee 0-122 AAROM   Strength  5/19/25  3/5 knee flex/ext, hip flex  Quad set fair  Sensation  full  Reflexes    Transfers: slow, requires UE assist  Gait: slower, using Single crutch, reciprocal pattern  DL Squat: 30 sec sit to stand 7, can perform without UE assist    Treatments:  Ther-EX:  - bike, 6 min level 5.5  LAQ  "12 lbs 2 x 15 uni  Side stepping YTB 30' x 3  Front to back and side to side stability board mini squat 30\" x 4    Manual:      Modalities:       PT Therapeutic Procedures Time Entry  Therapeutic Exercise Time Entry: 35  Manual Therapy Time Entry: 10       EDUCATION: Home exercise program, plan of care, activity modifications, pain management, and injury pathology. Education on Lateral step ups at home, extension stretch, and flexion      Code:  [id number]    Medical History Form: Reviewed (scanned into chart)  Reviewed medical form, Key [employer], Falls risk, Mormonism beliefs, PHQ     Screening  Frequency  Date Last Completed   Spiritual and Cultural Beliefs   Screening each visit or episode of care 4/23/2025   Falls Risk Screening every ambulatory visit    Pain Screening annually at primary care visit  8/4/2023   Domestic Violence screening annually at primary care visit    Depression Screening annually in the primary care setting 4/2/2025   Suicide Risk Screening annually in the primary care setting 4/2/2025   Nutrition and Food Insecurity   Screening at least annually at primary care visit  4/2/2025   Key Learner annually in the primary care setting 4/23/2025   Drug Screen  4/2/2025  5:54 AM   Alcohol Screen  4/2/2025  5:54 AM   Advance Directive       Time Calculation  Start Time: 0830  Stop Time: 0910  Time Calculation (min): 40 min    PT Therapeutic Procedures Time Entry  Therapeutic Exercise Time Entry: 35  Manual Therapy Time Entry: 10          Assessment:   He has demonstrated readiness for closed chain exercises to promote functional strength and neuromuscular control. Progressed well with strengthening, strong focus on closed chain for functional weightbearing improvements     Complexity:Low  Prognosis: good  Response to care: good  Clinical Presentation: Stable and/or uncomplicated characteristics  Personal Factors: None    Problem List:  Pain  Function  Mobility  Strength    Plan:  Continue glute " strengthening exercises and controlled decent while on stairs.   Planned Interventions include: therapeutic exercise, self-care home management, manual therapy, therapeutic activities, gait training, neuromuscular coordination, vasopneumatic, dry needling, aquatic therapy    Next visit  focus on close chain position, strengthening, and stretching exercises.    Frequency: 2 x Week  Duration: 8 Weeks  Goals: Set and discussed today  4 weeks  Patient to have pain less than 4/10 for improved end range flexion- MET  Patient to be independent with HEP and progression for improved carryover- MET  Improved ability to perform sit to stand without UE assist, 7 reps 30 sec sit to stand  Improved tolerance with knee flexion to 120deg AAROM- MET    8 weeks  Patient to have improved LEFS score by 15 points for improved function at home  Patient to have pain less than 2/10 for improved ADL performance- MET  Improved ability to navigate stairs reciprocally with no single UE assist for better function with daily activities  ROM 0-125 improved to for participation in outdoor activities  - MET    Plan of care was developed with input and agreement by the patient        Gaudencio Blevins, PT

## 2025-06-17 ENCOUNTER — TREATMENT (OUTPATIENT)
Dept: PHYSICAL THERAPY | Facility: CLINIC | Age: 60
End: 2025-06-17
Payer: COMMERCIAL

## 2025-06-17 DIAGNOSIS — M17.11 PRIMARY OSTEOARTHRITIS OF RIGHT KNEE: ICD-10-CM

## 2025-06-17 PROCEDURE — 97110 THERAPEUTIC EXERCISES: CPT | Mod: GP | Performed by: PHYSICAL THERAPIST

## 2025-06-17 PROCEDURE — 97140 MANUAL THERAPY 1/> REGIONS: CPT | Mod: GP | Performed by: PHYSICAL THERAPIST

## 2025-06-17 NOTE — PROGRESS NOTES
Physical Therapy  Physical Therapy Orthopedic Evaluation    Patient Name: Tye Rowley  MRN: [id number]  Today's Date: 6/17/2025  Time Calculation  Start Time: 0745       PT Therapeutic Procedures Time Entry  Therapeutic Exercise Time Entry: 30  Manual Therapy Time Entry: 10       Insurance:  Visit number: 16  Authorization info: No auth  Insurance Type: Payor: ANTHEM / Plan: ANTHEM HMP / Product Type: *No Product type* /      Current Problem     Problem List Items Addressed This Visit           ICD-10-CM    Primary osteoarthritis of right knee M17.11       Surgery:4/2/25 R TKA Dr. Lisa    Referred by: Nely Hutton PA-C      Precautions:   L TKA 2017    Subjective:   Subjective   Patient feel good, still notices  generalized swelling    General:     Current Condition:   Better    Pain:  Location: R knee  2/10    Relevant Information (PMH & Previous Tests/Imaging): + imaging prior to surgery    Previous Interventions/Treatments: Physical Therapy    Prior Level of Function (PLOF)  Patient previously independent with all ADLs  Exercise/Physical Activity: hiking  Work/School: -     Patients Living Environment: Reviewed and no concern  Primary Language: English  Patient's Goal(s) for Therapy: walk normal, kneeling     Objective:  Objective   L knee 40.8 cm supra, 41 cm mid  R knee 44 cm mid patella, 401 cm supra  Palpation/Observation  Mild soreness medial hamstring and anterior thigh  Posture  Mildly flexed R knee in standing  ROM  5/19/25  L knee 0-120 AAROM    5/21/ 2025    L Knee 0-122 AAROM   Strength  5/19/25  3/5 knee flex/ext, hip flex  Quad set fair  Sensation  full  Reflexes    Transfers: slow, requires UE assist  Gait: slower, using Single crutch, reciprocal pattern  DL Squat: 30 sec sit to stand 7, can perform without UE assist    Treatments:  Ther-EX:  - bike, 6 min level 5.5  LAQ 12 lbs 2 x 15 uni  Side stepping YTB 30' x 3  Isolated TRX static lunge 3 x  10  Standing table support standing calf raise 2 x 15      Manual:  R knee STM hamstring for extension    Modalities:       PT Therapeutic Procedures Time Entry  Therapeutic Exercise Time Entry: 30  Manual Therapy Time Entry: 10       EDUCATION: Home exercise program, plan of care, activity modifications, pain management, and injury pathology. Education on Lateral step ups at home, extension stretch, and flexion      Code:  [id number]    Medical History Form: Reviewed (scanned into chart)  Reviewed medical form, Key [place], Falls risk, Uatsdin beliefs, PHQ     Screening  Frequency  Date Last Completed   Spiritual and Cultural Beliefs   Screening each visit or episode of care 4/23/2025   Falls Risk Screening every ambulatory visit    Pain Screening annually at primary care visit  8/4/2023   Domestic Violence screening annually at primary care visit    Depression Screening annually in the primary care setting 4/2/2025   Suicide Risk Screening annually in the primary care setting 4/2/2025   Nutrition and Food Insecurity   Screening at least annually at primary care visit  4/2/2025   Key Learner annually in the primary care setting 4/23/2025   Drug Screen  4/2/2025  5:54 AM   Alcohol Screen  4/2/2025  5:54 AM   Advance Directive       Time Calculation  Start Time: 0745    PT Therapeutic Procedures Time Entry  Therapeutic Exercise Time Entry: 30  Manual Therapy Time Entry: 10          Assessment:   Responded well to closed chain exercise, fatigued with unilateral strengthening, would benefit from isolated exercise to increase strength and readiness to return to work    Complexity:Low  Prognosis: good  Response to care: good  Clinical Presentation: Stable and/or uncomplicated characteristics  Personal Factors: None    Problem List:  Pain  Function  Mobility  Strength    Plan:  Continue glute strengthening exercises and controlled decent while on stairs.   Planned Interventions include: therapeutic exercise, self-care  home management, manual therapy, therapeutic activities, gait training, neuromuscular coordination, vasopneumatic, dry needling, aquatic therapy    Next visit  focus on close chain position, strengthening, and stretching exercises.    Frequency: 1  Duration: 4wks  Goals: Set and discussed today  4 weeks  Patient to have pain less than 4/10 for improved end range flexion- MET  Patient to be independent with HEP and progression for improved carryover- MET  Improved ability to perform sit to stand without UE assist, 7 reps 30 sec sit to stand  Improved tolerance with knee flexion to 120deg AAROM- MET    8 weeks  Patient to have improved LEFS score by 15 points for improved function at home  Patient to have pain less than 2/10 for improved ADL performance- MET  Improved ability to navigate stairs reciprocally with no single UE assist for better function with daily activities  ROM 0-125 improved to for participation in outdoor activities  - MET    Plan of care was developed with input and agreement by the patient        Gaudencio Blevins, PT

## 2025-06-19 ENCOUNTER — TREATMENT (OUTPATIENT)
Dept: PHYSICAL THERAPY | Facility: CLINIC | Age: 60
End: 2025-06-19
Payer: COMMERCIAL

## 2025-06-19 DIAGNOSIS — M17.11 PRIMARY OSTEOARTHRITIS OF RIGHT KNEE: ICD-10-CM

## 2025-06-19 PROCEDURE — 97140 MANUAL THERAPY 1/> REGIONS: CPT | Mod: GP | Performed by: PHYSICAL THERAPIST

## 2025-06-19 PROCEDURE — 97110 THERAPEUTIC EXERCISES: CPT | Mod: GP | Performed by: PHYSICAL THERAPIST

## 2025-06-19 NOTE — PROGRESS NOTES
Physical Therapy  Physical Therapy Orthopedic Evaluation    Patient Name: Tye Rowley  MRN: [id number]  Today's Date: 6/19/2025  Time Calculation  Start Time: 0725  Stop Time: 0807  Time Calculation (min): 42 min       PT Therapeutic Procedures Time Entry  Therapeutic Exercise Time Entry: 32  Manual Therapy Time Entry: 8       Insurance:  Visit number: 17  Authorization info: No auth  Insurance Type: Payor: ANTHEM / Plan: ANTHEM HMP / Product Type: *No Product type* /      Current Problem     Problem List Items Addressed This Visit           ICD-10-CM    Primary osteoarthritis of right knee M17.11       Surgery:4/2/25 R TKA Dr. Lisa    Referred by: Nely Hutton PA-C      Precautions:   L TKA 2017    Subjective:   Subjective   Patient feel good, still notices  generalized swelling    General:     Current Condition:   Better    Pain:  Location: R knee  2/10    Relevant Information (PMH & Previous Tests/Imaging): + imaging prior to surgery    Previous Interventions/Treatments: Physical Therapy    Prior Level of Function (PLOF)  Patient previously independent with all ADLs  Exercise/Physical Activity: hiking  Work/School: -     Patients Living Environment: Reviewed and no concern  Primary Language: English  Patient's Goal(s) for Therapy: walk normal, kneeling     Objective:  Objective   L knee 40.8 cm supra, 41 cm mid  R knee 44 cm mid patella, 401 cm supra  Palpation/Observation  Mild soreness medial hamstring and anterior thigh  Posture  Mildly flexed R knee in standing  ROM  5/19/25  L knee 0-120 AAROM    5/21/ 2025    L Knee 0-122 AAROM   Strength  5/19/25  3/5 knee flex/ext, hip flex  Quad set fair  Sensation  full  Reflexes    Transfers: slow, requires UE assist  Gait: slower, using Single crutch, reciprocal pattern  DL Squat: 30 sec sit to stand 7, can perform without UE assist    Treatments:  Ther-EX:  - bike, 6 min level 5.5  -assisted pistol squat TRX bands to  box 3 x 8  -Isolated TRX static lunge 3 x 10  -Side stepping YTB 30' x 3  - HS curl unilateral 12 lbs 3 x 15      Manual:  R knee STM hamstring for extension, prone knee flexion and extension, massage gun in prone to HS    Modalities:       PT Therapeutic Procedures Time Entry  Therapeutic Exercise Time Entry: 32  Manual Therapy Time Entry: 8       EDUCATION: Home exercise program, plan of care, activity modifications, pain management, and injury pathology. Education on Lateral step ups at home, extension stretch, and flexion      Code:  [id number]    Medical History Form: Reviewed (scanned into chart)  Reviewed medical form, Key [place], Falls risk, Nondenominational beliefs, PHQ     Screening  Frequency  Date Last Completed   Spiritual and Cultural Beliefs   Screening each visit or episode of care 4/23/2025   Falls Risk Screening every ambulatory visit    Pain Screening annually at primary care visit  8/4/2023   Domestic Violence screening annually at primary care visit    Depression Screening annually in the primary care setting 4/2/2025   Suicide Risk Screening annually in the primary care setting 4/2/2025   Nutrition and Food Insecurity   Screening at least annually at primary care visit  4/2/2025   Key Learner annually in the primary care setting 4/23/2025   Drug Screen  4/2/2025  5:54 AM   Alcohol Screen  4/2/2025  5:54 AM   Advance Directive       Time Calculation  Start Time: 0725  Stop Time: 0807  Time Calculation (min): 42 min    PT Therapeutic Procedures Time Entry  Therapeutic Exercise Time Entry: 32  Manual Therapy Time Entry: 8          Assessment:   Responded well to closed chain exercise unilateral strengthening. Mild fatigue with less UE assist. Shows excellent ROM for extension and flexion, mild quad weakness especially with controlling the eccentric lowering phase    Complexity:Low  Prognosis: good  Response to care: good  Clinical Presentation: Stable and/or uncomplicated characteristics  Personal  Factors: None    Problem List:  Pain  Function  Mobility  Strength    Plan:  Continue glute strengthening exercises and controlled decent while on stairs.   Planned Interventions include: therapeutic exercise, self-care home management, manual therapy, therapeutic activities, gait training, neuromuscular coordination, vasopneumatic, dry needling, aquatic therapy    Next visit  focus on close chain position, strengthening, and stretching exercises.    Frequency: 1  Duration: 4wks  Goals: Set and discussed today  4 weeks  Patient to have pain less than 4/10 for improved end range flexion- MET  Patient to be independent with HEP and progression for improved carryover- MET  Improved ability to perform sit to stand without UE assist, 7 reps 30 sec sit to stand  Improved tolerance with knee flexion to 120deg AAROM- MET    8 weeks  Patient to have improved LEFS score by 15 points for improved function at home  Patient to have pain less than 2/10 for improved ADL performance- MET  Improved ability to navigate stairs reciprocally with no single UE assist for better function with daily activities  ROM 0-125 improved to for participation in outdoor activities  - MET    Plan of care was developed with input and agreement by the patient        Gaudencio Blevins, PT

## 2025-07-15 ENCOUNTER — TREATMENT (OUTPATIENT)
Dept: PHYSICAL THERAPY | Facility: CLINIC | Age: 60
End: 2025-07-15
Payer: COMMERCIAL

## 2025-07-15 DIAGNOSIS — M17.11 PRIMARY OSTEOARTHRITIS OF RIGHT KNEE: ICD-10-CM

## 2025-07-15 PROCEDURE — 97110 THERAPEUTIC EXERCISES: CPT | Mod: GP | Performed by: PHYSICAL THERAPIST

## 2025-07-15 PROCEDURE — 97140 MANUAL THERAPY 1/> REGIONS: CPT | Mod: GP | Performed by: PHYSICAL THERAPIST

## 2025-07-15 NOTE — PROGRESS NOTES
"  Physical Therapy  Physical Therapy Orthopedic Evaluation    Patient Name: Tye Rowley  MRN: [id number]  Today's Date: 7/15/2025  Time Calculation  Start Time: 1245  Stop Time: 1330  Time Calculation (min): 45 min       PT Therapeutic Procedures Time Entry  Therapeutic Exercise Time Entry: 30  Manual Therapy Time Entry: 10       Insurance:  Visit number: 18  Authorization info: No auth  Insurance Type: Payor: ANTHEM / Plan: ANTHEM HMP / Product Type: *No Product type* /      Current Problem     Problem List Items Addressed This Visit           ICD-10-CM    Primary osteoarthritis of right knee M17.11       Surgery:4/2/25 R TKA Dr. Lisa    Referred by: Nely Hutton PA-C      Precautions:   L TKA 2017    Subjective:   Subjective   Patient doing good, feels strength improving,     General:     Current Condition:   Better    Pain:  Location: R knee  2/10    Relevant Information (PMH & Previous Tests/Imaging): + imaging prior to surgery    Previous Interventions/Treatments: Physical Therapy    Prior Level of Function (PLOF)  Patient previously independent with all ADLs  Exercise/Physical Activity: hiking  Work/School: -     Patients Living Environment: Reviewed and no concern  Primary Language: English  Patient's Goal(s) for Therapy: walk normal, kneeling     Objective:  Objective   L knee 40.8 cm supra, 41 cm mid  R knee 44 cm mid patella, 401 cm supra  Palpation/Observation  Mild soreness medial hamstring and anterior thigh  Posture  Mildly flexed R knee in standing  ROM  5/19/25  R knee 0-120 AAROM    5/21/ 2025    R Knee 0-122 AAROM   Strength  5/19/25  3/5 knee flex/ext, hip flex  Quad set fair  Sensation  full  Reflexes    Transfers: slow, requires UE assist  Gait: slower, using Single crutch, reciprocal pattern  DL Squat: 30 sec sit to stand 7, can perform without UE assist    Treatments:  Ther-EX:  - bike, 6 min level 5.5  - wall squat 5 x 20\"  - standing UE " "assisted SL calf raise 2 x 15 ea   50 lb sled push  and pull 20 \" x 4  - HS curl 36 lbs x 20 DL, 24 lbs 3 x 10-12            Manual:  R knee IASTM to it band and distal lateral HS sidelying    Modalities:       PT Therapeutic Procedures Time Entry  Therapeutic Exercise Time Entry: 30  Manual Therapy Time Entry: 10       EDUCATION: Home exercise program, plan of care, activity modifications, pain management, and injury pathology. Education on Lateral step ups at home, extension stretch, and flexion      Code:  [id number]    Medical History Form: Reviewed (scanned into chart)  Reviewed medical form, [place], Falls risk, [employer] beliefs, PHQ  Annually for patients age 55 and older and patients with life-threatening illness and more frequently at the discretion of the provider when there has been a change in patient condition/clinical indication.   Screening  Date Last Completed   Advance Directives      Annually during a primary care office visit and at the discretion of the provider when there has been a change in patient condition/clinical indication.  Depression Screening 4/2/2025   Suicide Risk Screening 4/2/2025     Annually during any provider office visit and at the discretion of the provider when there has been a change in patient condition/clinical indication.  Alcohol Screening 4/2/2025  5:54 AM   Substance Use Screening 4/2/2025  5:54 AM    Tobacco 4/2/2025  5:54 AM     Completed at the discretion of the provider during any provider office visit or when there has been a change in patient condition/clinical indication.  Domestic Violence    Human Trafficking    Spiritual/Cultural  4/23/2025   Food Insecurity Screening Social Determinants of Health  4/2/2025   Patient Education/Key Learner 4/23/2025   Pain Screening 8/4/2023     Completed per Adult Falls Prevention (Outpatient), CP-119  Falls Risk Screening            Time Calculation  Start Time: 1245  Stop Time: 1330  Time Calculation (min): 45 min    PT " Therapeutic Procedures Time Entry  Therapeutic Exercise Time Entry: 30  Manual Therapy Time Entry: 10          Assessment:   Tolerated higher level strengthening. Mild It band tightness. No complaints of knee soreness    Complexity:Low  Prognosis: good  Response to care: good  Clinical Presentation: Stable and/or uncomplicated characteristics  Personal Factors: None    Problem List:  Pain  Function  Mobility  Strength    Plan:  Continue glute strengthening exercises and controlled decent while on stairs.   Planned Interventions include: therapeutic exercise, self-care home management, manual therapy, therapeutic activities, gait training, neuromuscular coordination, vasopneumatic, dry needling, aquatic therapy    Next visit  focus on close chain position, strengthening, and stretching exercises.    Frequency: 1  Duration: 4wks  Goals: Set and discussed today  4 weeks  Patient to have pain less than 4/10 for improved end range flexion- MET  Patient to be independent with HEP and progression for improved carryover- MET  Improved ability to perform sit to stand without UE assist, 7 reps 30 sec sit to stand  Improved tolerance with knee flexion to 120deg AAROM- MET    8 weeks  Patient to have improved LEFS score by 15 points for improved function at home  Patient to have pain less than 2/10 for improved ADL performance- MET  Improved ability to navigate stairs reciprocally with no single UE assist for better function with daily activities  ROM 0-125 improved to for participation in outdoor activities  - MET    Plan of care was developed with input and agreement by the patient        Gaudencio Blevins PT

## 2025-07-22 ENCOUNTER — TREATMENT (OUTPATIENT)
Dept: PHYSICAL THERAPY | Facility: CLINIC | Age: 60
End: 2025-07-22
Payer: COMMERCIAL

## 2025-07-22 DIAGNOSIS — M17.11 PRIMARY OSTEOARTHRITIS OF RIGHT KNEE: ICD-10-CM

## 2025-07-22 PROCEDURE — 97110 THERAPEUTIC EXERCISES: CPT | Mod: GP | Performed by: PHYSICAL THERAPIST

## 2025-07-22 PROCEDURE — 97140 MANUAL THERAPY 1/> REGIONS: CPT | Mod: GP | Performed by: PHYSICAL THERAPIST

## 2025-07-22 NOTE — PROGRESS NOTES
Physical Therapy  Physical Therapy Orthopedic Evaluation    Patient Name: Tye Rowley  MRN: [id number]  Today's Date: 7/22/2025  Time Calculation  Start Time: 0830  Stop Time: 0915  Time Calculation (min): 45 min       PT Therapeutic Procedures Time Entry  Therapeutic Exercise Time Entry: 25  Manual Therapy Time Entry: 20       Insurance:  Visit number: 19  Authorization info: No auth  Insurance Type: Payor: ANTHEM / Plan: ANTHEM HMP / Product Type: *No Product type* /      Current Problem     Problem List Items Addressed This Visit           ICD-10-CM    Primary osteoarthritis of right knee M17.11       Surgery:4/2/25 R TKA Dr. Lisa    Referred by: Nely Hutton PA-C      Precautions:   L TKA 2017    Subjective:   Subjective   Knee feel good no complaint, went golfing Saturday did okay walking on grass. Sore that night and the next day    General:     Current Condition:   Better    Pain:  Location: R knee  2/10    Relevant Information (PMH & Previous Tests/Imaging): + imaging prior to surgery    Previous Interventions/Treatments: Physical Therapy    Prior Level of Function (PLOF)  Patient previously independent with all ADLs  Exercise/Physical Activity: hiking  Work/School: -     Patients Living Environment: Reviewed and no concern  Primary Language: English  Patient's Goal(s) for Therapy: walk normal, kneeling     Objective:  Objective   L knee 40.8 cm supra, 41 cm mid  R knee 44 cm mid patella, 401 cm supra  Palpation/Observation  Mild soreness medial hamstring and anterior thigh  Posture  Mildly flexed R knee in standing  ROM  5/19/25  R knee 0-120 AAROM    5/21/ 2025    R Knee 0-122 AAROM   Strength  5/19/25  3/5 knee flex/ext, hip flex  Quad set fair  Sensation  full  Reflexes    Transfers: slow, requires UE assist  Gait: slower, using Single crutch, reciprocal pattern  DL Squat: 30 sec sit to stand 7, can perform without UE assist    Treatments:  Ther-EX:  -  "bike, 6 min level 5.5  - wall squat 5 x 20\"  - 50 lb sled push  and pull 20 \" x 5   - leg extension machine 36lbs SL   - Shuttle x 20 DL 75 lbs    Manual:  R knee IASTM to IT band and distal lateral HS     Modalities:       PT Therapeutic Procedures Time Entry  Therapeutic Exercise Time Entry: 25  Manual Therapy Time Entry: 20       EDUCATION: Home exercise program, plan of care, activity modifications, pain management, and injury pathology. Education on Lateral step ups at home, extension stretch, and flexion      Code:  [id number]    Medical History Form: Reviewed (scanned into chart)  Reviewed medical form, [place], Falls risk, [employer] beliefs, PHQ  Annually for patients age 55 and older and patients with life-threatening illness and more frequently at the discretion of the provider when there has been a change in patient condition/clinical indication.   Screening  Date Last Completed   Advance Directives      Annually during a primary care office visit and at the discretion of the provider when there has been a change in patient condition/clinical indication.  Depression Screening 4/2/2025   Suicide Risk Screening 4/2/2025     Annually during any provider office visit and at the discretion of the provider when there has been a change in patient condition/clinical indication.  Alcohol Screening 4/2/2025  5:54 AM   Substance Use Screening 4/2/2025  5:54 AM    Tobacco 4/2/2025  5:54 AM     Completed at the discretion of the provider during any provider office visit or when there has been a change in patient condition/clinical indication.  Domestic Violence    Human Trafficking    Spiritual/Cultural  4/23/2025   Food Insecurity Screening Social Determinants of Health  4/2/2025   Patient Education/Key Learner 4/23/2025   Pain Screening 8/4/2023     Completed per Adult Falls Prevention (Outpatient), CP-119  Falls Risk Screening            Time Calculation  Start Time: 0830  Stop Time: 0915  Time Calculation (min): " 45 min    PT Therapeutic Procedures Time Entry  Therapeutic Exercise Time Entry: 25  Manual Therapy Time Entry: 20          Assessment:   Patient tolerated increased strengthening well, with mild IT band tightness noted and no reports of knee soreness    Complexity:Low  Prognosis: good  Response to care: good  Clinical Presentation: Stable and/or uncomplicated characteristics  Personal Factors: None    Problem List:  Pain  Function  Mobility  Strength    Plan:  Continue glute strengthening exercises and controlled decent while on stairs.   Planned Interventions include: therapeutic exercise, self-care home management, manual therapy, therapeutic activities, gait training, neuromuscular coordination, vasopneumatic, dry needling, aquatic therapy    Next visit  focus on close chain position, strengthening, and stretching exercises.    Frequency: 1  Duration: 4wks  Goals: Set and discussed today  4 weeks  Patient to have pain less than 4/10 for improved end range flexion- MET  Patient to be independent with HEP and progression for improved carryover- MET  Improved ability to perform sit to stand without UE assist, 7 reps 30 sec sit to stand  Improved tolerance with knee flexion to 120deg AAROM- MET    8 weeks  Patient to have improved LEFS score by 15 points for improved function at home  Patient to have pain less than 2/10 for improved ADL performance- MET  Improved ability to navigate stairs reciprocally with no single UE assist for better function with daily activities  ROM 0-125 improved to for participation in outdoor activities  - MET    Plan of care was developed with input and agreement by the patient        Gaudencio Blevins PT

## 2025-07-29 ENCOUNTER — APPOINTMENT (OUTPATIENT)
Dept: PHYSICAL THERAPY | Facility: CLINIC | Age: 60
End: 2025-07-29
Payer: COMMERCIAL

## 2025-07-29 DIAGNOSIS — M17.11 PRIMARY OSTEOARTHRITIS OF RIGHT KNEE: ICD-10-CM

## 2025-08-04 ENCOUNTER — APPOINTMENT (OUTPATIENT)
Dept: PHYSICAL THERAPY | Facility: CLINIC | Age: 60
End: 2025-08-04
Payer: COMMERCIAL

## 2025-08-04 DIAGNOSIS — M17.11 PRIMARY OSTEOARTHRITIS OF RIGHT KNEE: ICD-10-CM

## 2025-08-12 ENCOUNTER — TREATMENT (OUTPATIENT)
Dept: PHYSICAL THERAPY | Facility: CLINIC | Age: 60
End: 2025-08-12
Payer: COMMERCIAL

## 2025-08-12 DIAGNOSIS — M17.11 PRIMARY OSTEOARTHRITIS OF RIGHT KNEE: ICD-10-CM

## 2025-08-12 PROCEDURE — 97110 THERAPEUTIC EXERCISES: CPT | Mod: GP | Performed by: PHYSICAL THERAPIST

## 2025-08-12 PROCEDURE — 97140 MANUAL THERAPY 1/> REGIONS: CPT | Mod: GP | Performed by: PHYSICAL THERAPIST

## (undated) DEVICE — DRESSING, TRANSPARENT, TEGADERM, 4 X 4.5

## (undated) DEVICE — GLOVE, SURGICAL, PROTEXIS PI ORTHO, 8.0, PF, LF

## (undated) DEVICE — DRESSING, MEPILEX BORDER, POST-OP AG, 4 X 12 IN

## (undated) DEVICE — GLOVE, SURGICAL, PROTEXIS PI , 8.0, PF, LF

## (undated) DEVICE — DRAIN, WOUND, ROUND, W/TROCAR, HOLE PATTERN, 10 IN, MEDIUM/LARGE, 3/16 X 49 IN

## (undated) DEVICE — 10IN STRYKER SMOKE EVACUATION TUBING

## (undated) DEVICE — SUTURE, VICRYL, 0, 18 IN, CT-1, UNDYED

## (undated) DEVICE — CEMENT, MIXEVAC III, 10S BOWL, KNEES

## (undated) DEVICE — DRESSING, MEPILEX, BORDER LIGHT, 3 X 3 IN

## (undated) DEVICE — Device

## (undated) DEVICE — BLADE, SAW, DUAL SAGITTAL, 1.9 X 90 X 30

## (undated) DEVICE — GLOVE, SURGICAL, PROTEXIS PI , 6.5, PF, LF

## (undated) DEVICE — DRAPE, INCISE, STERI DRAPE, LARGE, 23 X 17 IN, DISPOSABLE, STERILE

## (undated) DEVICE — DRAPE, IRRIGATION, W/POUCH, ADHESIVE STRIP, STERI DRAPE, 19 X 23 IN, DISPOSABLE, STERILE

## (undated) DEVICE — SUTURE, ETHIBOND, P2, V-37, 30 IN, GREEN

## (undated) DEVICE — SYRINGE, 50 CC, LUER LOCK

## (undated) DEVICE — SOLUTION, INJECTION, 0.9% SODIUM CHL, USP LIFECARE 1000 MI

## (undated) DEVICE — GLOVE, SURGICAL, PROTEXIS PI , 7.5, PF, LF

## (undated) DEVICE — SYRINGE, 60 CC, IRRIGATION, BULB, CONTRO-BULB, PAPER POUCH

## (undated) DEVICE — DRAPE, ISOLATION, INCISE, W/POUCH, STERI DRAPE, 125 X 83 IN, DISPOSABLE, STERILE

## (undated) DEVICE — CUFF, TOURNIQUET, 30 X 4, DUAL PORT/SNGL BLADDER, DISP, LF

## (undated) DEVICE — GLOVE, SURGICAL, PROTEXIS PI BLUE W/NEUTHERA, 7.0, PF, LF

## (undated) DEVICE — ADHESIVE, SKIN, DERMABOND ADVANCED, 15CM, PEN-STYLE

## (undated) DEVICE — HOOD, SURGICAL, FLYTE HYBRID

## (undated) DEVICE — STRIP, SKIN CLOSURE, STERI STRIP, REINFORCED, 0.5 X 4 IN

## (undated) DEVICE — TUBING, IRRIGATION, HIGH FLOW, HAND PIECE SET

## (undated) DEVICE — SUTURE, MONOCRYL, 3-0, PS-1 27IN, UNDYED

## (undated) DEVICE — NEEDLE, SPINAL, QUINCKE, 18 G X 3.5 IN, PINK HUB

## (undated) DEVICE — SUTURE, VICRYL, 2-0, 18 IN CP-2, UNDYED

## (undated) DEVICE — CHANNEL DRAIN, BARD, 15FR, ROUND HUBLESS, FULL FLUTED

## (undated) DEVICE — DRAPE, SHEET, U, W/ADHESIVE STRIP, IMPERVIOUS, 60 X 70 IN, DISPOSABLE, LF, STERILE